# Patient Record
Sex: MALE | Race: WHITE | Employment: OTHER | URBAN - METROPOLITAN AREA
[De-identification: names, ages, dates, MRNs, and addresses within clinical notes are randomized per-mention and may not be internally consistent; named-entity substitution may affect disease eponyms.]

---

## 2022-09-19 ENCOUNTER — HOSPITAL ENCOUNTER (EMERGENCY)
Age: 62
Discharge: ANOTHER HOSPITAL | End: 2022-09-19
Attending: EMERGENCY MEDICINE
Payer: COMMERCIAL

## 2022-09-19 ENCOUNTER — APPOINTMENT (OUTPATIENT)
Dept: GENERAL RADIOLOGY | Age: 62
End: 2022-09-19
Payer: COMMERCIAL

## 2022-09-19 VITALS
HEART RATE: 133 BPM | WEIGHT: 185 LBS | RESPIRATION RATE: 22 BRPM | BODY MASS INDEX: 27.4 KG/M2 | DIASTOLIC BLOOD PRESSURE: 64 MMHG | HEIGHT: 69 IN | OXYGEN SATURATION: 99 % | TEMPERATURE: 98.2 F | SYSTOLIC BLOOD PRESSURE: 104 MMHG

## 2022-09-19 DIAGNOSIS — E13.10 SECONDARY DM WITH DKA, UNCONTROLLED (HCC): ICD-10-CM

## 2022-09-19 DIAGNOSIS — N49.3 FOURNIER'S GANGRENE OF SCROTUM: Primary | ICD-10-CM

## 2022-09-19 LAB
ALBUMIN SERPL-MCNC: 1.5 G/DL (ref 3.5–4.6)
ALP BLD-CCNC: 145 U/L (ref 35–104)
ALT SERPL-CCNC: 24 U/L (ref 0–41)
ANION GAP SERPL CALCULATED.3IONS-SCNC: 22 MEQ/L (ref 9–15)
AST SERPL-CCNC: 28 U/L (ref 0–40)
BACTERIA: NEGATIVE /HPF
BANDED NEUTROPHILS RELATIVE PERCENT: 16 %
BASE EXCESS VENOUS: -14 (ref -3–3)
BASOPHILS ABSOLUTE: 0 K/UL (ref 0–0.2)
BASOPHILS RELATIVE PERCENT: 0.2 %
BETA-HYDROXYBUTYRATE: 48.8 MG/DL (ref 0.2–2.8)
BILIRUB SERPL-MCNC: 1.1 MG/DL (ref 0.2–0.7)
BILIRUBIN URINE: NEGATIVE
BLOOD, URINE: ABNORMAL
BUN BLDV-MCNC: 48 MG/DL (ref 8–23)
CALCIUM IONIZED: 1.04 MMOL/L (ref 1.12–1.32)
CALCIUM SERPL-MCNC: 7.3 MG/DL (ref 8.5–9.9)
CHLORIDE BLD-SCNC: 99 MEQ/L (ref 95–107)
CLARITY: CLEAR
CO2: 13 MEQ/L (ref 20–31)
COLOR: ABNORMAL
CREAT SERPL-MCNC: 0.97 MG/DL (ref 0.7–1.2)
EOSINOPHILS ABSOLUTE: 0 K/UL (ref 0–0.7)
EOSINOPHILS RELATIVE PERCENT: 0 %
EPITHELIAL CELLS, UA: ABNORMAL /HPF (ref 0–5)
ETHANOL PERCENT: NORMAL G/DL
ETHANOL: <10 MG/DL (ref 0–0.08)
GFR AFRICAN AMERICAN: >60
GFR AFRICAN AMERICAN: >60
GFR NON-AFRICAN AMERICAN: 56
GFR NON-AFRICAN AMERICAN: >60
GLOBULIN: 3 G/DL (ref 2.3–3.5)
GLUCOSE BLD-MCNC: 561 MG/DL (ref 70–99)
GLUCOSE BLD-MCNC: 576 MG/DL (ref 70–99)
GLUCOSE BLD-MCNC: >600 MG/DL (ref 70–99)
GLUCOSE URINE: >=1000 MG/DL
HCO3 VENOUS: 13.3 MMOL/L (ref 23–29)
HCT VFR BLD CALC: 33 % (ref 42–52)
HEMOGLOBIN: 10.9 G/DL (ref 14–18)
HEMOGLOBIN: 12.8 GM/DL (ref 13.5–17.5)
HYALINE CASTS: ABNORMAL /HPF (ref 0–5)
KETONES, URINE: 15 MG/DL
LACTATE: 6.67 MMOL/L (ref 0.4–2)
LACTIC ACID: 6 MMOL/L (ref 0.5–2.2)
LEUKOCYTE ESTERASE, URINE: NEGATIVE
LYMPHOCYTES ABSOLUTE: 0.2 K/UL (ref 1–4.8)
LYMPHOCYTES RELATIVE PERCENT: 1 %
MCH RBC QN AUTO: 29.5 PG (ref 27–31.3)
MCHC RBC AUTO-ENTMCNC: 33.1 % (ref 33–37)
MCV RBC AUTO: 89.2 FL (ref 80–100)
MONOCYTES ABSOLUTE: 0.2 K/UL (ref 0.2–0.8)
MONOCYTES RELATIVE PERCENT: 1 %
MYELOCYTE PERCENT: 3 %
NEUTROPHILS ABSOLUTE: 18 K/UL (ref 1.4–6.5)
NEUTROPHILS RELATIVE PERCENT: 78 %
NITRITE, URINE: NEGATIVE
O2 SAT, VEN: 59 %
PCO2, VEN: 28.7 MM HG (ref 40–50)
PDW BLD-RTO: 14.7 % (ref 11.5–14.5)
PERFORMED ON: ABNORMAL
PERFORMED ON: ABNORMAL
PH UA: 5.5 (ref 5–9)
PH VENOUS: 7.27 (ref 7.32–7.42)
PLATELET # BLD: 164 K/UL (ref 130–400)
PLATELET SLIDE REVIEW: NORMAL
PO2, VEN: 34 MM HG
POC CHLORIDE: 100 MEQ/L (ref 99–110)
POC CREATININE: 1.3 MG/DL (ref 0.8–1.3)
POC HEMATOCRIT: 38 % (ref 41–53)
POC POTASSIUM: 2.8 MEQ/L (ref 3.5–5.1)
POC SAMPLE TYPE: ABNORMAL
POC SODIUM: 134 MEQ/L (ref 136–145)
POTASSIUM SERPL-SCNC: 2.8 MEQ/L (ref 3.4–4.9)
PROMYELOCYTES PERCENT: 1 %
PROTEIN UA: ABNORMAL MG/DL
RBC # BLD: 3.7 M/UL (ref 4.7–6.1)
RBC UA: ABNORMAL /HPF (ref 0–5)
SARS-COV-2, NAAT: NOT DETECTED
SODIUM BLD-SCNC: 134 MEQ/L (ref 135–144)
SPECIFIC GRAVITY UA: 1.03 (ref 1–1.03)
TCO2 CALC VENOUS: 14 MMOL/L
TOTAL CK: 296 U/L (ref 0–190)
TOTAL PROTEIN: 4.5 G/DL (ref 6.3–8)
URINE REFLEX TO CULTURE: ABNORMAL
UROBILINOGEN, URINE: 1 E.U./DL
WBC # BLD: 18.4 K/UL (ref 4.8–10.8)
WBC UA: ABNORMAL /HPF (ref 0–5)

## 2022-09-19 PROCEDURE — 2580000003 HC RX 258: Performed by: EMERGENCY MEDICINE

## 2022-09-19 PROCEDURE — 82803 BLOOD GASES ANY COMBINATION: CPT

## 2022-09-19 PROCEDURE — 71045 X-RAY EXAM CHEST 1 VIEW: CPT

## 2022-09-19 PROCEDURE — 85025 COMPLETE CBC W/AUTO DIFF WBC: CPT

## 2022-09-19 PROCEDURE — 80053 COMPREHEN METABOLIC PANEL: CPT

## 2022-09-19 PROCEDURE — 96374 THER/PROPH/DIAG INJ IV PUSH: CPT

## 2022-09-19 PROCEDURE — 85014 HEMATOCRIT: CPT

## 2022-09-19 PROCEDURE — 6370000000 HC RX 637 (ALT 250 FOR IP): Performed by: EMERGENCY MEDICINE

## 2022-09-19 PROCEDURE — 6360000002 HC RX W HCPCS: Performed by: EMERGENCY MEDICINE

## 2022-09-19 PROCEDURE — 84295 ASSAY OF SERUM SODIUM: CPT

## 2022-09-19 PROCEDURE — 83605 ASSAY OF LACTIC ACID: CPT

## 2022-09-19 PROCEDURE — 99285 EMERGENCY DEPT VISIT HI MDM: CPT

## 2022-09-19 PROCEDURE — 96365 THER/PROPH/DIAG IV INF INIT: CPT

## 2022-09-19 PROCEDURE — 36600 WITHDRAWAL OF ARTERIAL BLOOD: CPT

## 2022-09-19 PROCEDURE — 81001 URINALYSIS AUTO W/SCOPE: CPT

## 2022-09-19 PROCEDURE — 84132 ASSAY OF SERUM POTASSIUM: CPT

## 2022-09-19 PROCEDURE — 87635 SARS-COV-2 COVID-19 AMP PRB: CPT

## 2022-09-19 PROCEDURE — 82010 KETONE BODYS QUAN: CPT

## 2022-09-19 PROCEDURE — 82077 ASSAY SPEC XCP UR&BREATH IA: CPT

## 2022-09-19 PROCEDURE — 82550 ASSAY OF CK (CPK): CPT

## 2022-09-19 PROCEDURE — 82435 ASSAY OF BLOOD CHLORIDE: CPT

## 2022-09-19 PROCEDURE — 87040 BLOOD CULTURE FOR BACTERIA: CPT

## 2022-09-19 PROCEDURE — 82565 ASSAY OF CREATININE: CPT

## 2022-09-19 PROCEDURE — 86403 PARTICLE AGGLUT ANTBDY SCRN: CPT

## 2022-09-19 PROCEDURE — 82948 REAGENT STRIP/BLOOD GLUCOSE: CPT

## 2022-09-19 PROCEDURE — 51702 INSERT TEMP BLADDER CATH: CPT

## 2022-09-19 PROCEDURE — 87150 DNA/RNA AMPLIFIED PROBE: CPT

## 2022-09-19 PROCEDURE — 96375 TX/PRO/DX INJ NEW DRUG ADDON: CPT

## 2022-09-19 PROCEDURE — 82330 ASSAY OF CALCIUM: CPT

## 2022-09-19 RX ORDER — SODIUM CHLORIDE 9 MG/ML
INJECTION, SOLUTION INTRAVENOUS
Status: DISCONTINUED
Start: 2022-09-19 | End: 2022-09-19 | Stop reason: HOSPADM

## 2022-09-19 RX ORDER — 0.9 % SODIUM CHLORIDE 0.9 %
30 INTRAVENOUS SOLUTION INTRAVENOUS ONCE
Status: COMPLETED | OUTPATIENT
Start: 2022-09-19 | End: 2022-09-19

## 2022-09-19 RX ORDER — LIDOCAINE HYDROCHLORIDE 20 MG/ML
JELLY TOPICAL ONCE
Status: DISCONTINUED | OUTPATIENT
Start: 2022-09-19 | End: 2022-09-19 | Stop reason: HOSPADM

## 2022-09-19 RX ADMIN — PIPERACILLIN AND TAZOBACTAM 3375 MG: 3; .375 INJECTION, POWDER, FOR SOLUTION INTRAVENOUS at 13:49

## 2022-09-19 RX ADMIN — SODIUM CHLORIDE 2517 ML: 9 INJECTION, SOLUTION INTRAVENOUS at 13:35

## 2022-09-19 RX ADMIN — VANCOMYCIN HYDROCHLORIDE 1250 MG: 1.25 INJECTION, POWDER, LYOPHILIZED, FOR SOLUTION INTRAVENOUS at 13:52

## 2022-09-19 RX ADMIN — INSULIN HUMAN 15 UNITS: 100 INJECTION, SOLUTION PARENTERAL at 14:28

## 2022-09-19 ASSESSMENT — PAIN - FUNCTIONAL ASSESSMENT: PAIN_FUNCTIONAL_ASSESSMENT: NONE - DENIES PAIN

## 2022-09-19 NOTE — ED TRIAGE NOTES
Pt reports to the Ed via ems with co being found on the floor for over 12 hours. Pt has black testicle, red swollen groin, abrasion to left knee, outside of feet have pressure areas,  left and right elbow have abrasions, foul smell coming from groin area grainage noted. Pt states the testicles have been like that for a couple days. Pt states she has been feeling weak over the last few weeks due to have a hemorrhoid and bleeding so he was unable stand so he lowered himself to the floor. Pt laid there and scooted  himself to his phone to call for help.

## 2022-09-19 NOTE — ED PROVIDER NOTES
[address] [facility] ED  EMERGENCY DEPARTMENT ENCOUNTER      Pt Name: Josie Perez  MRN: [id number]  [place] 1960  Date of evaluation: 9/19/2022  Provider: Henry Arambula DO    CHIEF COMPLAINT       Chief Complaint   Patient presents with    Blood Infection     Found on floor for over 12 hours black testicles     Groin Pain     Jaclyn gangrene         HISTORY OF PRESENT ILLNESS   (Location/Symptom, Timing/Onset, Context/Setting, Quality, Duration, Modifying Factors, Severity)  Note limiting factors. Josie Perez is a 57 y.o. male who presents to the emergency department . Patient found on the floor after 12 hours. Is denying falling on the floor. Has groin pain. Has pressure lower abdomen feels like he needs to urinate. Has been having some difficulty urinating because his penis will come out. Patient was diagnosed with diabetes 15 years ago but lost weight and thought he was cured. Patient does not take any medications. Does not see a doctor. Does not drink alcohol. States that in July he thought he had a swollen hemorrhoid. He kept putting steroid cream on it. Now his whole groin is swollen and painful. HPI    Nursing Notes were reviewed. REVIEW OF SYSTEMS    (2-9 systems for level 4, 10 or more for level 5)     Review of Systems   Unable to perform ROS: Mental status change     Except as noted above the remainder of the review of systems was reviewed and negative. PAST MEDICAL HISTORY   No past medical history on file. SURGICAL HISTORY     No past surgical history on file. CURRENT MEDICATIONS       Previous Medications    No medications on file       ALLERGIES     Patient has no known allergies. FAMILY HISTORY     No family history on file.        SOCIAL HISTORY       Social History     Socioeconomic History    Marital status: Unknown       SCREENINGS        Monroe Coma Scale  Eye Opening: Spontaneous  Best Verbal Response: Oriented  Best Motor Response: Obeys commands  Jones Coma Scale Score: 15               PHYSICAL EXAM    (up to 7 for level 4, 8 or more for level 5)     ED Triage Vitals [09/19/22 1323]   BP Temp Temp Source Heart Rate Resp SpO2 Height Weight   (!) 89/54 98.2 °F (36.8 °C) Oral (!) 146 23 92 % 5' 9\" (1.753 m) 185 lb (83.9 kg)       Physical Exam  Constitutional:       General: He is not in acute distress. Appearance: He is well-developed. He is ill-appearing and toxic-appearing. He is not diaphoretic. HENT:      Head: Normocephalic and atraumatic. Right Ear: External ear normal.      Left Ear: External ear normal.      Nose: Nose normal.      Mouth/Throat:      Mouth: Mucous membranes are dry. Pharynx: No oropharyngeal exudate. Eyes:      Extraocular Movements: Extraocular movements intact. Conjunctiva/sclera: Conjunctivae normal.      Pupils: Pupils are equal, round, and reactive to light. Neck:      Thyroid: No thyromegaly. Vascular: No JVD. Trachea: No tracheal deviation. Cardiovascular:      Rate and Rhythm: Regular rhythm. Tachycardia present. Heart sounds: Normal heart sounds. No murmur heard. Pulmonary:      Effort: Pulmonary effort is normal. No respiratory distress. Breath sounds: Normal breath sounds. No wheezing. Abdominal:      General: Bowel sounds are normal.      Palpations: Abdomen is soft. Tenderness: There is no abdominal tenderness. There is no guarding. Genitourinary:     Comments: Entire scrotum is black and necrotic. Malodorous. Penis stuck in due to surrounding swollen tissue. Musculoskeletal:         General: Normal range of motion. Cervical back: Normal range of motion and neck supple. Right lower leg: No edema. Left lower leg: No edema. Comments: Left upper thigh swollen hot red indurated and continues into the perineum and scrotum   Skin:     General: Skin is warm and dry. Findings: No rash.    Neurological:      General: No focal deficit present. Mental Status: He is alert. Cranial Nerves: No cranial nerve deficit. Psychiatric:         Behavior: Behavior normal.       DIAGNOSTIC RESULTS     EKG: All EKG's are interpreted by the Emergency Department Physician who either signs or Co-signs this chart in the absence of a cardiologist.        RADIOLOGY:   Non-plain film images such as CT, Ultrasound and MRI are read by the radiologist. Plain radiographic images are visualized and preliminarily interpreted by the emergency physician with the below findings:        Interpretation per the Radiologist below, if available at the time of this note:    XR CHEST PORTABLE    (Results Pending)         ED BEDSIDE ULTRASOUND:   Performed by ED Physician - none    LABS:  Labs Reviewed   CBC WITH AUTO DIFFERENTIAL - Abnormal; Notable for the following components:       Result Value    WBC 18.4 (*)     RBC 3.70 (*)     Hemoglobin 10.9 (*)     Hematocrit 33.0 (*)     RDW 14.7 (*)     All other components within normal limits   CULTURE, BLOOD 1   CULTURE, BLOOD 2   COVID-19, RAPID   COMPREHENSIVE METABOLIC PANEL   ETHANOL   URINALYSIS WITH REFLEX TO CULTURE   BETA-HYDROXYBUTYRATE   LACTIC ACID   CK   POCT GLUCOSE   POCT EPOC BLOOD GAS, LACTIC ACID, ICA   POCT GLUCOSE       All other labs were within normal range or not returned as of this dictation. 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 Patient likely in DKA. Sugar reading high labs not back at this time. Initial blood pressure low blood pressure better and responding to IV fluids. Patient given vancomycin and Zosyn.   Spoke to Dr De Souza [facility] at [facility]. Accepting to ER. Venous blood gas obtained and shows patient is in DKA. Patient continues to get IV fluids and will be given 1 dose of insulin. Cannot transport patient on insulin drip. MDM        REASSESSMENT          CRITICAL CARE TIME   Total Critical Care time was 60 minutes, excluding separately reportable procedures. There was a high probability of clinically significant/life threatening deterioration in the patient's condition which required my urgent intervention. CONSULTS:  None    PROCEDURES:  Unless otherwise noted below, none     Procedures      FINAL IMPRESSION      1. Jaclyn's gangrene of scrotum    2. Secondary DM with DKA, uncontrolled ([facility] [address]          DISPOSITION/PLAN   DISPOSITION Decision To Transfer 09/19/2022 01:30:20 PM      PATIENT REFERRED TO:  No follow-up provider specified. DISCHARGE MEDICATIONS:  New Prescriptions    No medications on file     Controlled Substances Monitoring:     No flowsheet data found.     (Please note that portions of this note were completed with a voice recognition program.  Efforts were made to edit the dictations but occasionally words are mis-transcribed.)    Gabe Pagan DO (electronically signed)  Attending Emergency Physician            Gabe Pagan DO  09/19/22 4568

## 2022-09-19 NOTE — ED NOTES
Call to [facility] Transfer center to speak with Urology. Per direction of Dr. Octavio Hale called placed directly to [facility] [place] and MAC was bi-passed.      Kenya Warren  09/19/22 1400

## 2022-09-23 LAB
ACINETOBACTER CALCOAC BAUMANNII COMPLEX BY PCR: NOT DETECTED
BACTEROIDES FRAGILIS BY PCR: NOT DETECTED
BLOOD CULTURE, ROUTINE: ABNORMAL
CANDIDA ALBICANS BY PCR: NOT DETECTED
CANDIDA AURIS BY PCR: NOT DETECTED
CANDIDA GLABRATA BY PCR: NOT DETECTED
CANDIDA KRUSEI BY PCR: NOT DETECTED
CANDIDA PARAPSILOSIS BY PCR: NOT DETECTED
CANDIDA TROPICALIS BY PCR: NOT DETECTED
CRYPTOCOCCUS NEOFORMANS/GATTII BY PCR: NOT DETECTED
ENTEROBACTER CLOACAE COMPLEX BY PCR: NOT DETECTED
ENTEROBACTERALES BY PCR: NOT DETECTED
ENTEROCOCCUS FAECALIS BY PCR: NOT DETECTED
ENTEROCOCCUS FAECIUM BY PCR: NOT DETECTED
ESCHERICHIA COLI BY PCR: NOT DETECTED
HAEMOPHILUS INFLUENZAE BY PCR: NOT DETECTED
KLEBSIELLA AEROGENES BY PCR: NOT DETECTED
KLEBSIELLA OXYTOCA BY PCR: NOT DETECTED
KLEBSIELLA PNEUMONIAE GROUP BY PCR: NOT DETECTED
LISTERIA MONOCYTOGENES BY PCR: NOT DETECTED
NEISSERIA MENINGITIDIS BY PCR: NOT DETECTED
PROTEUS SPECIES BY PCR: NOT DETECTED
PSEUDOMONAS AERUGINOSA BY PCR: NOT DETECTED
SALMONELLA SPECIES BY PCR: NOT DETECTED
SERRATIA MARCESCENS BY PCR: NOT DETECTED
STAPHYLOCOCCUS AUREUS BY PCR: NOT DETECTED
STAPHYLOCOCCUS EPIDERMIDIS BY PCR: NOT DETECTED
STAPHYLOCOCCUS LUGDUNENSIS BY PCR: NOT DETECTED
STAPHYLOCOCCUS SPECIES BY PCR: NOT DETECTED
STENOTROPHOMONAS MALTOPHILIA BY PCR: NOT DETECTED
STREPTOCOCCUS AGALACTIAE BY PCR: NOT DETECTED
STREPTOCOCCUS PNEUMONIAE BY PCR: NOT DETECTED
STREPTOCOCCUS PYOGENES  BY PCR: NOT DETECTED
STREPTOCOCCUS SPECIES BY PCR: DETECTED

## 2022-09-24 LAB
CULTURE, BLOOD 2: NORMAL
CULTURE, BLOOD ID SENSITIVITY: ABNORMAL
ORGANISM: ABNORMAL

## 2022-10-06 ENCOUNTER — TELEPHONE (OUTPATIENT)
Dept: FAMILY MEDICINE CLINIC | Age: 62
End: 2022-10-06

## 2022-10-06 NOTE — TELEPHONE ENCOUNTER
----- Message from Andreina Denzel sent at 10/6/2022  8:16 AM EDT -----  Subject: Message to Provider    QUESTIONS  Information for Provider? Pt missed last appt 9/21 due to being   hospitalized and having surgery several times and skin graphs for flesh   eating bacterial infection. Pt will contact when he is discharged for   follow up after skilled nursing facilty lets him out.   ---------------------------------------------------------------------------  --------------  [id number] [employer]  [phone]; OK to leave message on voicemail  ---------------------------------------------------------------------------  --------------  SCRIPT ANSWERS  Relationship to Patient?  Self

## 2022-12-06 ENCOUNTER — TELEPHONE (OUTPATIENT)
Dept: FAMILY MEDICINE CLINIC | Age: 62
End: 2022-12-06

## 2022-12-06 DIAGNOSIS — R73.9 HYPERGLYCEMIA: Primary | ICD-10-CM

## 2022-12-06 RX ORDER — FLASH GLUCOSE SENSOR
KIT MISCELLANEOUS
Qty: 6 EACH | Refills: 3 | Status: SHIPPED | OUTPATIENT
Start: 2022-12-06

## 2022-12-06 RX ORDER — FLASH GLUCOSE SCANNING READER
EACH MISCELLANEOUS
Qty: 1 EACH | Refills: 0 | Status: SHIPPED | OUTPATIENT
Start: 2022-12-06

## 2022-12-06 RX ORDER — BLOOD-GLUCOSE SENSOR
EACH MISCELLANEOUS
Status: CANCELLED | OUTPATIENT
Start: 2022-12-06

## 2022-12-06 NOTE — TELEPHONE ENCOUNTER
Orders Placed This Encounter   Medications    Continuous Blood Gluc  (FREESTYLE SONY 14 DAY READER) HELENE     Sig: Test daily and prn. Dx: DM2     Dispense:  1 each     Refill:  0    Continuous Blood Gluc Sensor (FREESTYLE SONY 14 DAY SENSOR) MISC     Sig: Test daily and prn. Change every 2 weeks. Dx: DM2     Dispense:  6 each     Refill:  3       The above med(s) were e-scripted to the patient's pharmacy.    Please advise patient  Pau Garcia MD

## 2022-12-06 NOTE — TELEPHONE ENCOUNTER
Pt is calling stating that he has enrolled in the free style adrienne III free trial, needs a script to be sent into [facility]. Pt is in a NH at this time, will be discharged in maybe 10 days. Pt phone number is [phone].

## 2022-12-09 RX ORDER — FLASH GLUCOSE SENSOR
KIT MISCELLANEOUS
Qty: 6 EACH | Refills: 3 | Status: CANCELLED | OUTPATIENT
Start: 2022-12-09

## 2022-12-09 RX ORDER — BLOOD-GLUCOSE SENSOR
EACH MISCELLANEOUS
Qty: 1 EACH | Refills: 0 | Status: SHIPPED | OUTPATIENT
Start: 2022-12-09

## 2022-12-09 NOTE — TELEPHONE ENCOUNTER
Pt calling for the freestyle adrienne 3 does not need the device because pt has rod, just actually need the continuous monitor/sensor for the Dauphin 3 please send to [employer] in [place] please and thank you.  Brother went to  but it was for the 14 day not for the Dauphin 3 wants to know if that can be sent in please and thank you

## 2022-12-09 NOTE — TELEPHONE ENCOUNTER
Orders Placed This Encounter   Medications    Continuous Blood Gluc  (FREESTYLE SONY 14 DAY READER) HELENE     Sig: Test daily and prn. Dx: DM2     Dispense:  1 each     Refill:  0    Continuous Blood Gluc Sensor (FREESTYLE SONY 14 DAY SENSOR) MISC     Sig: Test daily and prn. Change every 2 weeks. Dx: DM2     Dispense:  6 each     Refill:  3    Continuous Blood Gluc Sensor (FREESTYLE SONY 3 SENSOR) MISC     Sig: Use as directed     Dispense:  1 each     Refill:  0       The above med(s) were e-scripted to the patient's pharmacy.    Please advise patient  Saran Hamilton MD

## 2023-01-04 LAB
AVERAGE GLUCOSE: NORMAL
HBA1C MFR BLD: 5.9 %

## 2023-02-09 ENCOUNTER — OFFICE VISIT (OUTPATIENT)
Dept: FAMILY MEDICINE CLINIC | Age: 63
End: 2023-02-09
Payer: COMMERCIAL

## 2023-02-09 VITALS
BODY MASS INDEX: 23.52 KG/M2 | OXYGEN SATURATION: 98 % | HEART RATE: 92 BPM | SYSTOLIC BLOOD PRESSURE: 106 MMHG | WEIGHT: 158.8 LBS | DIASTOLIC BLOOD PRESSURE: 68 MMHG | HEIGHT: 69 IN | TEMPERATURE: 47.5 F

## 2023-02-09 DIAGNOSIS — M72.6 NECROTIZING FASCIITIS OF LOWER LEG (HCC): ICD-10-CM

## 2023-02-09 DIAGNOSIS — E11.9 TYPE 2 DIABETES MELLITUS WITHOUT COMPLICATION, WITHOUT LONG-TERM CURRENT USE OF INSULIN (HCC): Primary | ICD-10-CM

## 2023-02-09 DIAGNOSIS — Z11.59 ENCOUNTER FOR HEPATITIS C SCREENING TEST FOR LOW RISK PATIENT: ICD-10-CM

## 2023-02-09 DIAGNOSIS — D64.9 ANEMIA, UNSPECIFIED TYPE: ICD-10-CM

## 2023-02-09 DIAGNOSIS — Z11.4 SCREENING FOR HIV (HUMAN IMMUNODEFICIENCY VIRUS): ICD-10-CM

## 2023-02-09 PROCEDURE — G8420 CALC BMI NORM PARAMETERS: HCPCS | Performed by: FAMILY MEDICINE

## 2023-02-09 PROCEDURE — G8427 DOCREV CUR MEDS BY ELIG CLIN: HCPCS | Performed by: FAMILY MEDICINE

## 2023-02-09 PROCEDURE — 1036F TOBACCO NON-USER: CPT | Performed by: FAMILY MEDICINE

## 2023-02-09 PROCEDURE — 99203 OFFICE O/P NEW LOW 30 MIN: CPT | Performed by: FAMILY MEDICINE

## 2023-02-09 PROCEDURE — 3017F COLORECTAL CA SCREEN DOC REV: CPT | Performed by: FAMILY MEDICINE

## 2023-02-09 PROCEDURE — 2022F DILAT RTA XM EVC RTNOPTHY: CPT | Performed by: FAMILY MEDICINE

## 2023-02-09 PROCEDURE — 3046F HEMOGLOBIN A1C LEVEL >9.0%: CPT | Performed by: FAMILY MEDICINE

## 2023-02-09 PROCEDURE — G8482 FLU IMMUNIZE ORDER/ADMIN: HCPCS | Performed by: FAMILY MEDICINE

## 2023-02-09 RX ORDER — METFORMIN HYDROCHLORIDE 500 MG/1
1000 TABLET, EXTENDED RELEASE ORAL
Qty: 180 TABLET | Refills: 1 | Status: SHIPPED | OUTPATIENT
Start: 2023-02-09

## 2023-02-09 RX ORDER — DOCUSATE SODIUM 100 MG/1
100 CAPSULE, LIQUID FILLED ORAL 2 TIMES DAILY
COMMUNITY

## 2023-02-09 SDOH — ECONOMIC STABILITY: HOUSING INSECURITY
IN THE LAST 12 MONTHS, WAS THERE A TIME WHEN YOU DID NOT HAVE A STEADY PLACE TO SLEEP OR SLEPT IN A SHELTER (INCLUDING NOW)?: NO

## 2023-02-09 SDOH — ECONOMIC STABILITY: FOOD INSECURITY: WITHIN THE PAST 12 MONTHS, YOU WORRIED THAT YOUR FOOD WOULD RUN OUT BEFORE YOU GOT MONEY TO BUY MORE.: NEVER TRUE

## 2023-02-09 SDOH — ECONOMIC STABILITY: INCOME INSECURITY: HOW HARD IS IT FOR YOU TO PAY FOR THE VERY BASICS LIKE FOOD, HOUSING, MEDICAL CARE, AND HEATING?: NOT HARD AT ALL

## 2023-02-09 SDOH — ECONOMIC STABILITY: FOOD INSECURITY: WITHIN THE PAST 12 MONTHS, THE FOOD YOU BOUGHT JUST DIDN'T LAST AND YOU DIDN'T HAVE MONEY TO GET MORE.: NEVER TRUE

## 2023-02-09 ASSESSMENT — PATIENT HEALTH QUESTIONNAIRE - PHQ9
SUM OF ALL RESPONSES TO PHQ QUESTIONS 1-9: 0
1. LITTLE INTEREST OR PLEASURE IN DOING THINGS: 0
SUM OF ALL RESPONSES TO PHQ QUESTIONS 1-9: 0
SUM OF ALL RESPONSES TO PHQ9 QUESTIONS 1 & 2: 0
SUM OF ALL RESPONSES TO PHQ QUESTIONS 1-9: 0
2. FEELING DOWN, DEPRESSED OR HOPELESS: 0
SUM OF ALL RESPONSES TO PHQ QUESTIONS 1-9: 0

## 2023-02-09 NOTE — PROGRESS NOTES
Chief Complaint   Patient presents with    Annual Exam     Check up, was in nursing home , was in [facility] for necrotizing faucitis    Discuss Medications     Would like something cheaper than [employer]     Health Maintenance     Would like to wait to do labs        HPI:  Charmel Barthel is a 60 y.o. male     Establish care  Was in [facility] and nursing home  Necrotizing fasciitis     Moved here from [place] 3 years ago     Had appt scheduled here and went to ER    Has had f/u with plastic surgery/urology/endocrinology     Has been back home since 12/26    He is     Patient Active Problem List   Diagnosis    Type 2 diabetes mellitus without complication (HCC)    Anemia    Necrotizing fasciitis of lower leg (HCC)       Current Outpatient Medications   Medication Sig Dispense Refill    docusate sodium (COLACE) 100 MG capsule Take 100 mg by mouth 2 times daily      metFORMIN (GLUCOPHAGE-XR) 500 MG extended release tablet Take 2 tablets by mouth daily (with breakfast) 180 tablet 1    Continuous Blood Gluc Sensor (FREESTYLE SONY 3 SENSOR) MISC Use as directed 1 each 0    Continuous Blood Gluc  (FREESTYLE SONY 14 DAY READER) HELENE Test daily and prn. Dx: DM2 1 each 0    Continuous Blood Gluc Sensor (FREESTYLE SONY 14 DAY SENSOR) MISC Test daily and prn. Change every 2 weeks. Dx: DM2 6 each 3     No current facility-administered medications for this visit. Past Medical History:   Diagnosis Date    Anemia     Difficulty walking     Jaclyn disease     Insulin long-term use ([address]     Necrotizing fasciitis of lower leg (HCC)     Type 2 diabetes mellitus without complication (HCC)     Urinary obstruction      Past Surgical History:   Procedure Laterality Date    COSMETIC SURGERY      TONSILLECTOMY       History reviewed. No pertinent family history.   Social History     Socioeconomic History    Marital status: Unknown     Spouse name: None    Number of children: None    Years of education: None    Highest education level: None   Tobacco Use    Smoking status: Never    Smokeless tobacco: Never   Substance and Sexual Activity    Alcohol use: Not Currently    Drug use: Never     Social Determinants of Health     Financial Resource Strain: Low Risk     Difficulty of Paying Living Expenses: Not hard at all   Food Insecurity: No Food Insecurity    Worried About Running Out of Food in the Last Year: Never true    Ran Out of Food in the Last Year: Never true   Transportation Needs: Unknown    Lack of Transportation (Non-Medical): No   Housing Stability: Unknown    Unstable Housing in the Last Year: No     No Known Allergies    Review of Systems:   General ROS: negative for - chills, fatigue, fever, malaise, weight gain or weight loss  Respiratory ROS: no cough, shortness of breath, or wheezing  Cardiovascular ROS: no chest pain or dyspnea on exertion  Gastrointestinal ROS: no abdominal pain, change in bowel habits, or black or bloody stools  Genito-Urinary ROS: no dysuria, trouble voiding  Musculoskeletal ROS: negative for - gait disturbance, joint pain or joint stiffness  Neurological ROS: negative for - behavioral changes, memory loss, numbness/tingling, tremors or weakness    In general patient otherwise reports feeling well. Physical Exam:  /68 (Site: Right Upper Arm)   Pulse 92   Temp (!) 47.5 °F (8.6 °C)   Ht 5' 9\" (1.753 m)   Wt 158 lb 12.8 oz (72 kg)   SpO2 98%   BMI 23.45 kg/m²     Gen: Well, NAD, Alert, Oriented x 3   HEENT: EOMI, eyes clear, MMM  Skin: without rash or jaundice  Neck: no significant lymphadenopathy or thyromegaly  Lungs: CTA B w/out Rales/Wheezes/Rhonchi, Good respiratory effort   Heart: RRR, S1S2, w/out M/R/G, non-displaced PMI   Ext: No C/C/E Bilaterally. Neuro: Neurovascularly intact w/ Sensory/Motor intact UE/LE Bilaterally.     Lab Results   Component Value Date    WBC 18.4 (H) 09/19/2022    HGB 12.8 (L) 09/19/2022    HCT 33.0 (L) 09/19/2022     09/19/2022    ALT 24 09/19/2022 AST 28 09/19/2022     (L) 09/19/2022    K 2.8 (LL) 09/19/2022    CL 99 09/19/2022    CREATININE 1.3 09/19/2022    BUN 48 (H) 09/19/2022    CO2 13 (L) 09/19/2022         A&P   Diagnosis Orders   1. Type 2 diabetes mellitus without complication, without long-term current use of insulin (HCC)  metFORMIN (GLUCOPHAGE-XR) 500 MG extended release tablet    Lipid Panel      2. Screening for HIV (human immunodeficiency virus)  HIV Screen      3. Encounter for hepatitis C screening test for low risk patient  Hepatitis C Antibody      4. Anemia, unspecified type        5.  Necrotizing fasciitis of lower leg (HCC)          Will change to metformin     Labs as ordered    F/u 6 mos       Tianna Hirsch MD

## 2023-04-05 ENCOUNTER — TELEPHONE (OUTPATIENT)
Dept: GASTROENTEROLOGY | Age: 63
End: 2023-04-05

## 2023-07-26 DIAGNOSIS — E11.9 TYPE 2 DIABETES MELLITUS WITHOUT COMPLICATION, WITHOUT LONG-TERM CURRENT USE OF INSULIN (HCC): ICD-10-CM

## 2023-07-26 DIAGNOSIS — Z11.59 ENCOUNTER FOR HEPATITIS C SCREENING TEST FOR LOW RISK PATIENT: ICD-10-CM

## 2023-07-26 DIAGNOSIS — E11.9 TYPE 2 DIABETES MELLITUS WITHOUT COMPLICATION, WITHOUT LONG-TERM CURRENT USE OF INSULIN (HCC): Primary | ICD-10-CM

## 2023-07-26 DIAGNOSIS — Z11.4 SCREENING FOR HIV (HUMAN IMMUNODEFICIENCY VIRUS): ICD-10-CM

## 2023-07-26 LAB
CHOLEST SERPL-MCNC: 223 MG/DL (ref 0–199)
HBA1C MFR BLD: 6.1 % (ref 4.8–5.9)
HDLC SERPL-MCNC: 60 MG/DL (ref 40–59)
LDLC SERPL CALC-MCNC: 148 MG/DL (ref 0–129)
TRIGL SERPL-MCNC: 76 MG/DL (ref 0–150)

## 2023-07-27 LAB
HEPATITIS C ANTIBODY: NONREACTIVE
HIV AG/AB: NONREACTIVE

## 2023-08-08 ENCOUNTER — APPOINTMENT (OUTPATIENT)
Dept: URBAN - METROPOLITAN AREA CLINIC 204 | Age: 63
Setting detail: DERMATOLOGY
End: 2023-08-09

## 2023-08-08 PROCEDURE — 99213 OFFICE O/P EST LOW 20 MIN: CPT | Mod: 25

## 2023-08-08 PROCEDURE — 17003 DESTRUCT PREMALG LES 2-14: CPT

## 2023-08-08 PROCEDURE — 17000 DESTRUCT PREMALG LESION: CPT

## 2023-08-08 PROCEDURE — OTHER MIPS QUALITY: OTHER

## 2023-08-10 ENCOUNTER — OFFICE VISIT (OUTPATIENT)
Dept: FAMILY MEDICINE CLINIC | Age: 63
End: 2023-08-10
Payer: COMMERCIAL

## 2023-08-10 VITALS
BODY MASS INDEX: 25.03 KG/M2 | DIASTOLIC BLOOD PRESSURE: 70 MMHG | TEMPERATURE: 99 F | HEART RATE: 77 BPM | OXYGEN SATURATION: 98 % | HEIGHT: 69 IN | WEIGHT: 169 LBS | SYSTOLIC BLOOD PRESSURE: 120 MMHG

## 2023-08-10 DIAGNOSIS — E78.5 DYSLIPIDEMIA: ICD-10-CM

## 2023-08-10 DIAGNOSIS — Z12.5 SCREENING PSA (PROSTATE SPECIFIC ANTIGEN): ICD-10-CM

## 2023-08-10 DIAGNOSIS — Z12.11 SCREEN FOR COLON CANCER: ICD-10-CM

## 2023-08-10 DIAGNOSIS — E11.9 TYPE 2 DIABETES MELLITUS WITHOUT COMPLICATION, WITHOUT LONG-TERM CURRENT USE OF INSULIN (HCC): Primary | ICD-10-CM

## 2023-08-10 DIAGNOSIS — E11.9 TYPE 2 DIABETES MELLITUS WITHOUT COMPLICATION, WITHOUT LONG-TERM CURRENT USE OF INSULIN (HCC): ICD-10-CM

## 2023-08-10 DIAGNOSIS — Z86.2 HISTORY OF ANEMIA: ICD-10-CM

## 2023-08-10 LAB
ALBUMIN SERPL-MCNC: 4.4 G/DL (ref 3.5–4.6)
ALP SERPL-CCNC: 91 U/L (ref 35–104)
ALT SERPL-CCNC: 35 U/L (ref 0–41)
ANION GAP SERPL CALCULATED.3IONS-SCNC: 10 MEQ/L (ref 9–15)
AST SERPL-CCNC: 27 U/L (ref 0–40)
BILIRUB SERPL-MCNC: 1.5 MG/DL (ref 0.2–0.7)
BUN SERPL-MCNC: 19 MG/DL (ref 8–23)
CALCIUM SERPL-MCNC: 9.6 MG/DL (ref 8.5–9.9)
CHLORIDE SERPL-SCNC: 104 MEQ/L (ref 95–107)
CO2 SERPL-SCNC: 26 MEQ/L (ref 20–31)
CREAT SERPL-MCNC: 0.54 MG/DL (ref 0.7–1.2)
CREAT UR-MCNC: 114.3 MG/DL
ERYTHROCYTE [DISTWIDTH] IN BLOOD BY AUTOMATED COUNT: 13.7 % (ref 11.5–14.5)
GLOBULIN SER CALC-MCNC: 2.5 G/DL (ref 2.3–3.5)
GLUCOSE SERPL-MCNC: 174 MG/DL (ref 70–99)
HCT VFR BLD AUTO: 39.9 % (ref 42–52)
HGB BLD-MCNC: 13.9 G/DL (ref 14–18)
MCH RBC QN AUTO: 30.9 PG (ref 27–31.3)
MCHC RBC AUTO-ENTMCNC: 34.7 % (ref 33–37)
MCV RBC AUTO: 89.1 FL (ref 79–92.2)
MICROALBUMIN UR-MCNC: <1.2 MG/DL
MICROALBUMIN/CREAT UR-RTO: NORMAL MG/G (ref 0–30)
PLATELET # BLD AUTO: 147 K/UL (ref 130–400)
POTASSIUM SERPL-SCNC: 4.4 MEQ/L (ref 3.4–4.9)
PROT SERPL-MCNC: 6.9 G/DL (ref 6.3–8)
PSA SERPL-MCNC: 0.83 NG/ML (ref 0–4)
RBC # BLD AUTO: 4.48 M/UL (ref 4.7–6.1)
SODIUM SERPL-SCNC: 140 MEQ/L (ref 135–144)
WBC # BLD AUTO: 6.4 K/UL (ref 4.8–10.8)

## 2023-08-10 PROCEDURE — G8420 CALC BMI NORM PARAMETERS: HCPCS | Performed by: FAMILY MEDICINE

## 2023-08-10 PROCEDURE — G8427 DOCREV CUR MEDS BY ELIG CLIN: HCPCS | Performed by: FAMILY MEDICINE

## 2023-08-10 PROCEDURE — 99214 OFFICE O/P EST MOD 30 MIN: CPT | Performed by: FAMILY MEDICINE

## 2023-08-10 PROCEDURE — 1036F TOBACCO NON-USER: CPT | Performed by: FAMILY MEDICINE

## 2023-08-10 PROCEDURE — 3017F COLORECTAL CA SCREEN DOC REV: CPT | Performed by: FAMILY MEDICINE

## 2023-08-10 PROCEDURE — 3044F HG A1C LEVEL LT 7.0%: CPT | Performed by: FAMILY MEDICINE

## 2023-08-10 PROCEDURE — 2022F DILAT RTA XM EVC RTNOPTHY: CPT | Performed by: FAMILY MEDICINE

## 2023-08-10 RX ORDER — METFORMIN HYDROCHLORIDE 500 MG/1
1000 TABLET, EXTENDED RELEASE ORAL
Qty: 180 TABLET | Refills: 1 | Status: SHIPPED | OUTPATIENT
Start: 2023-08-10

## 2023-08-10 NOTE — PROGRESS NOTES
Chief Complaint   Patient presents with    Results     Go over recent blood work     Diabetes     6 month        HPI:  Lennox Minks is a 60 y.o. male     Check up  Review recent labs    A1c good at 6.1    Lipids high  Declining statin right now       Patient Active Problem List   Diagnosis    Type 2 diabetes mellitus without complication (HCC)    Anemia    Necrotizing fasciitis of lower leg (HCC)       Current Outpatient Medications   Medication Sig Dispense Refill    metFORMIN (GLUCOPHAGE-XR) 500 MG extended release tablet Take 2 tablets by mouth daily (with breakfast) 180 tablet 1    Continuous Blood Gluc Sensor (FREESTYLE SONY 3 SENSOR) MISC Use as directed 1 each 0    Continuous Blood Gluc  (FREESTYLE SONY 14 DAY READER) HELENE Test daily and prn. Dx: DM2 1 each 0    docusate sodium (COLACE) 100 MG capsule Take 100 mg by mouth 2 times daily      Continuous Blood Gluc Sensor (FREESTYLE SONY 14 DAY SENSOR) MISC Test daily and prn. Change every 2 weeks. Dx: DM2 6 each 3     No current facility-administered medications for this visit. Past Medical History:   Diagnosis Date    Anemia     Difficulty walking     Jaclyn disease     Insulin long-term use ([address] )     Necrotizing fasciitis of lower leg (HCC)     Type 2 diabetes mellitus without complication (HCC)     Urinary obstruction      Past Surgical History:   Procedure Laterality Date    COSMETIC SURGERY      TONSILLECTOMY       History reviewed. No pertinent family history.   Social History     Socioeconomic History    Marital status: Unknown     Spouse name: None    Number of children: None    Years of education: None    Highest education level: None   Tobacco Use    Smoking status: Never    Smokeless tobacco: Never   Substance and Sexual Activity    Alcohol use: Not Currently    Drug use: Never     Social Determinants of Health     Financial Resource Strain: Low Risk     Difficulty of Paying Living Expenses: Not hard at all   Food Insecurity: No

## 2023-08-11 LAB
FOLATE: 7.2 NG/ML
IRON SATURATION: 23 % (ref 20–55)
IRON: 55 UG/DL (ref 59–158)
TOTAL IRON BINDING CAPACITY: 240 UG/DL (ref 250–450)
UNSATURATED IRON BINDING CAPACITY: 185 UG/DL (ref 112–347)
VITAMIN B-12: 335 PG/ML (ref 232–1245)

## 2023-09-05 DIAGNOSIS — E11.9 TYPE 2 DIABETES MELLITUS WITHOUT COMPLICATION, WITHOUT LONG-TERM CURRENT USE OF INSULIN (HCC): ICD-10-CM

## 2023-09-05 NOTE — TELEPHONE ENCOUNTER
Comments: pt is asking for a 90 day supply    Last Office Visit (last PCP visit):   8/10/2023    Next Visit Date:  Future Appointments   Date Time Provider [address]   2/12/2024  2:00 PM Halima España MD [facility]       **If hasn't been seen in over a year OR hasn't followed up according to last diabetes/ADHD visit, make appointment for patient before sending refill to provider.     Rx requested:  Requested Prescriptions     Pending Prescriptions Disp Refills    metFORMIN (GLUCOPHAGE-XR) 500 MG extended release tablet 180 tablet 1     Sig: Take 2 tablets by mouth daily (with breakfast)

## 2023-09-08 RX ORDER — METFORMIN HYDROCHLORIDE 500 MG/1
1000 TABLET, EXTENDED RELEASE ORAL
Qty: 180 TABLET | Refills: 1 | Status: SHIPPED | OUTPATIENT
Start: 2023-09-08

## 2023-12-05 DIAGNOSIS — R73.9 HYPERGLYCEMIA: ICD-10-CM

## 2023-12-05 RX ORDER — BLOOD-GLUCOSE SENSOR
EACH MISCELLANEOUS
Qty: 6 EACH | Refills: 5 | Status: SHIPPED | OUTPATIENT
Start: 2023-12-05

## 2023-12-05 NOTE — TELEPHONE ENCOUNTER
Comments: Please review    Last Office Visit (last PCP visit):   8/10/2023    Next Visit Date:  Future Appointments   Date Time Provider [address]   2/12/2024  2:00 PM Bhaskar Harden MD [facility] [place]       **If hasn't been seen in over a year OR hasn't followed up according to last diabetes/ADHD visit, make appointment for patient before sending refill to provider.     Rx requested:  Requested Prescriptions     Pending Prescriptions Disp Refills    Continuous Blood Gluc Sensor (FREESTYLE SONY 3 SENSOR) MISC [Pharmacy Med Name: Rosa Peaks 3 SENSOR] 6 each      Sig: use every 14 days as directed by prescriber

## 2024-01-17 DIAGNOSIS — E11.9 TYPE 2 DIABETES MELLITUS WITHOUT COMPLICATION, WITHOUT LONG-TERM CURRENT USE OF INSULIN (HCC): ICD-10-CM

## 2024-01-18 RX ORDER — METFORMIN HYDROCHLORIDE 500 MG/1
1000 TABLET, EXTENDED RELEASE ORAL
Qty: 180 TABLET | Refills: 3 | Status: SHIPPED | OUTPATIENT
Start: 2024-01-18

## 2024-01-18 NOTE — TELEPHONE ENCOUNTER
Comments:     Last Office Visit (last PCP visit):   08/30/2023    Next Visit Date:  Future Appointments   Date Time Provider Department Center   2/12/2024  2:00 PM Tobias Lewis MD [facility]       **If hasn't been seen in over a year OR hasn't followed up according to last diabetes/ADHD visit, make appointment for patient before sending refill to provider.    Rx requested:  Requested Prescriptions     Pending Prescriptions Disp Refills    metFORMIN (GLUCOPHAGE-XR) 500 MG extended release tablet [Pharmacy Med Name: metFORMIN HCl  MG Oral Tablet Extended Release 24 Hour] 180 tablet 3     Sig: TAKE 2 TABLETS BY MOUTH DAILY  WITH BREAKFAST

## 2024-01-22 ENCOUNTER — OFFICE VISIT (OUTPATIENT)
Dept: FAMILY MEDICINE CLINIC | Age: 64
End: 2024-01-22
Payer: COMMERCIAL

## 2024-01-22 VITALS
DIASTOLIC BLOOD PRESSURE: 60 MMHG | HEIGHT: 69 IN | WEIGHT: 190.2 LBS | BODY MASS INDEX: 28.17 KG/M2 | OXYGEN SATURATION: 98 % | SYSTOLIC BLOOD PRESSURE: 112 MMHG | HEART RATE: 97 BPM

## 2024-01-22 DIAGNOSIS — L08.9 SOFT TISSUE INFECTION: Primary | ICD-10-CM

## 2024-01-22 PROCEDURE — G8484 FLU IMMUNIZE NO ADMIN: HCPCS | Performed by: FAMILY MEDICINE

## 2024-01-22 PROCEDURE — 1036F TOBACCO NON-USER: CPT | Performed by: FAMILY MEDICINE

## 2024-01-22 PROCEDURE — 99213 OFFICE O/P EST LOW 20 MIN: CPT | Performed by: FAMILY MEDICINE

## 2024-01-22 PROCEDURE — G8427 DOCREV CUR MEDS BY ELIG CLIN: HCPCS | Performed by: FAMILY MEDICINE

## 2024-01-22 PROCEDURE — 3017F COLORECTAL CA SCREEN DOC REV: CPT | Performed by: FAMILY MEDICINE

## 2024-01-22 PROCEDURE — G8419 CALC BMI OUT NRM PARAM NOF/U: HCPCS | Performed by: FAMILY MEDICINE

## 2024-01-22 RX ORDER — SULFAMETHOXAZOLE AND TRIMETHOPRIM 800; 160 MG/1; MG/1
1 TABLET ORAL 2 TIMES DAILY
Qty: 14 TABLET | Refills: 0 | Status: SHIPPED | OUTPATIENT
Start: 2024-01-22 | End: 2024-01-29

## 2024-01-22 ASSESSMENT — PATIENT HEALTH QUESTIONNAIRE - PHQ9
SUM OF ALL RESPONSES TO PHQ9 QUESTIONS 1 & 2: 0
SUM OF ALL RESPONSES TO PHQ QUESTIONS 1-9: 0
2. FEELING DOWN, DEPRESSED OR HOPELESS: 0
SUM OF ALL RESPONSES TO PHQ QUESTIONS 1-9: 0
1. LITTLE INTEREST OR PLEASURE IN DOING THINGS: 0

## 2024-01-22 ASSESSMENT — ENCOUNTER SYMPTOMS: BACK PAIN: 0

## 2024-01-22 NOTE — PROGRESS NOTES
[facility]  [address], [address]  [facility] [address]  Dept: [phone]  Dept Fax: [phone]  Loc: [phone]     1/22/2024    Visit type: Follow up    Reason for Visit: Abscess (Pt states abscess around 1 1/2 inches on his Lt buttock, groin area. States there's some tenderness and sore, symptoms started 2 days ago.  Denies pain, itchy, discharge. )         Patient: Bruce Almendarez is a 63 y.o. male   HPI: 63-year-old male presents with possible abscess of his left lower medial gluteal region.  Patient states he has had some tenderness and has been ongoing for about 2 days.  Patient does have a history of severe necrotizing fasciitis.    ASSESSMENT/PLAN   1. Soft tissue infection  -     sulfamethoxazole-trimethoprim (BACTRIM DS;SEPTRA DS) 800-160 MG per tablet; Take 1 tablet by mouth 2 times daily for 7 days, Disp-14 tablet, R-0Normal    -Patient will monitor for fevers or worsening  -Adverse effects of medications prescribed were discussed, patient acknowledged understanding.  -Patient was advised to let me know if there is no improvement in the next 7 to 10 days. Discussed red flags warranting decision to go to the emergency department and patient understood.     Orders Placed This Encounter   Medications    sulfamethoxazole-trimethoprim (BACTRIM DS;SEPTRA DS) 800-160 MG per tablet     Sig: Take 1 tablet by mouth 2 times daily for 7 days     Dispense:  14 tablet     Refill:  0        Treatment plan/ rationale and result expectations have been discussed with the patient who expresses understanding and desires to proceed.  Medication adverse effects, labs, images have been reviewed with the patient.    Subjective      Review of Systems   Musculoskeletal:  Negative for arthralgias, back pain, gait problem, joint swelling, myalgias and neck pain.        Left gluteal pain and swelling   Skin:  Negative for wound.        Possible abscess of the

## 2024-02-01 ENCOUNTER — OFFICE VISIT (OUTPATIENT)
Dept: FAMILY MEDICINE CLINIC | Age: 64
End: 2024-02-01
Payer: COMMERCIAL

## 2024-02-01 VITALS
OXYGEN SATURATION: 98 % | HEART RATE: 72 BPM | DIASTOLIC BLOOD PRESSURE: 62 MMHG | BODY MASS INDEX: 27.85 KG/M2 | TEMPERATURE: 97.4 F | SYSTOLIC BLOOD PRESSURE: 126 MMHG | WEIGHT: 188.6 LBS

## 2024-02-01 DIAGNOSIS — H92.02 EAR PAIN, LEFT: ICD-10-CM

## 2024-02-01 DIAGNOSIS — H66.002 NON-RECURRENT ACUTE SUPPURATIVE OTITIS MEDIA OF LEFT EAR WITHOUT SPONTANEOUS RUPTURE OF TYMPANIC MEMBRANE: Primary | ICD-10-CM

## 2024-02-01 DIAGNOSIS — L08.9 SOFT TISSUE INFECTION: ICD-10-CM

## 2024-02-01 DIAGNOSIS — H61.23 BILATERAL IMPACTED CERUMEN: ICD-10-CM

## 2024-02-01 PROCEDURE — 1036F TOBACCO NON-USER: CPT | Performed by: FAMILY MEDICINE

## 2024-02-01 PROCEDURE — 3017F COLORECTAL CA SCREEN DOC REV: CPT | Performed by: FAMILY MEDICINE

## 2024-02-01 PROCEDURE — G8427 DOCREV CUR MEDS BY ELIG CLIN: HCPCS | Performed by: FAMILY MEDICINE

## 2024-02-01 PROCEDURE — 69209 REMOVE IMPACTED EAR WAX UNI: CPT | Performed by: FAMILY MEDICINE

## 2024-02-01 PROCEDURE — 99213 OFFICE O/P EST LOW 20 MIN: CPT | Performed by: FAMILY MEDICINE

## 2024-02-01 PROCEDURE — G8484 FLU IMMUNIZE NO ADMIN: HCPCS | Performed by: FAMILY MEDICINE

## 2024-02-01 PROCEDURE — G8419 CALC BMI OUT NRM PARAM NOF/U: HCPCS | Performed by: FAMILY MEDICINE

## 2024-02-01 RX ORDER — AMOXICILLIN AND CLAVULANATE POTASSIUM 875; 125 MG/1; MG/1
1 TABLET, FILM COATED ORAL 2 TIMES DAILY
Qty: 14 TABLET | Refills: 0 | Status: SHIPPED | OUTPATIENT
Start: 2024-02-01 | End: 2024-02-08

## 2024-02-01 ASSESSMENT — ENCOUNTER SYMPTOMS
SHORTNESS OF BREATH: 0
DIARRHEA: 0
ROS SKIN COMMENTS: GLUTEAL ABSCESS
ABDOMINAL PAIN: 0
EYE DISCHARGE: 0
RHINORRHEA: 0
SORE THROAT: 0
COUGH: 0
NAUSEA: 0
VOMITING: 0
EYE REDNESS: 0

## 2024-02-01 NOTE — PROGRESS NOTES
[facility]  [address], [address]  [facility] [address]  Dept: [phone]  Dept Fax: [phone]  Loc: [phone]     2/1/2024    Visit type: Follow up    Reason for Visit: Otalgia (Pt states  that LT ear is bothering him and he believes to be throwing off balance. X 2 days), Wound Infection (Pt states that he was in on 1/22/24 for wound on buttocks. Notes that it is doing much better but asking that you take a look at it. ), and Health Maintenance (Patricia with [facility] on the 12th)         Patient: Bruce Almendarez is a 63 y.o. male   HPI: 63-year-old male presents with left ear pain and pressure causing him to have balance issues especially when he first attempts to stand from laying or seated position.  Patient states this been ongoing for the last few days.  In addition patient would like me to inspect previous wound, he had an abscess and was prescribed antibiotic on previous encounter, states that has improved.    ASSESSMENT/PLAN   1. Non-recurrent acute suppurative otitis media of left ear without spontaneous rupture of tympanic membrane  -     amoxicillin-clavulanate (AUGMENTIN) 875-125 MG per tablet; Take 1 tablet by mouth 2 times daily for 7 days, Disp-14 tablet, R-0Normal  2. Ear pain, left  3. Bilateral impacted cerumen  -     OR REMOVAL IMPACTED CERUMEN IRRIGATION/LVG UNILAT  4. Soft tissue infection  -Gluteal abscess inspected, no sign of infection at this time.  No pus is noted, drying out and in the process of healing completely    -Erythematous and bulging left tympanic membrane once cerumen was irrigated, medication prescribed  -Adverse effects of medications prescribed were discussed, patient acknowledged understanding.  -Patient was advised to let me know if there is no improvement in the next 7 to 10 days. Discussed red flags warranting decision to go to the emergency department and patient understood.   -Patient had significant

## 2024-02-12 ENCOUNTER — OFFICE VISIT (OUTPATIENT)
Dept: FAMILY MEDICINE CLINIC | Age: 64
End: 2024-02-12
Payer: COMMERCIAL

## 2024-02-12 VITALS
DIASTOLIC BLOOD PRESSURE: 62 MMHG | HEIGHT: 69 IN | BODY MASS INDEX: 28.26 KG/M2 | HEART RATE: 75 BPM | SYSTOLIC BLOOD PRESSURE: 124 MMHG | TEMPERATURE: 97.2 F | WEIGHT: 190.8 LBS | OXYGEN SATURATION: 99 %

## 2024-02-12 DIAGNOSIS — E11.9 TYPE 2 DIABETES MELLITUS WITHOUT COMPLICATION, WITHOUT LONG-TERM CURRENT USE OF INSULIN (HCC): ICD-10-CM

## 2024-02-12 DIAGNOSIS — E11.9 TYPE 2 DIABETES MELLITUS WITHOUT COMPLICATION, WITHOUT LONG-TERM CURRENT USE OF INSULIN (HCC): Primary | ICD-10-CM

## 2024-02-12 DIAGNOSIS — L08.9 SOFT TISSUE INFECTION: ICD-10-CM

## 2024-02-12 DIAGNOSIS — H61.22 EXCESSIVE CERUMEN IN LEFT EAR CANAL: ICD-10-CM

## 2024-02-12 LAB — HBA1C MFR BLD: 6.5 % (ref 4.8–5.9)

## 2024-02-12 PROCEDURE — 99214 OFFICE O/P EST MOD 30 MIN: CPT | Performed by: FAMILY MEDICINE

## 2024-02-12 PROCEDURE — 2022F DILAT RTA XM EVC RTNOPTHY: CPT | Performed by: FAMILY MEDICINE

## 2024-02-12 PROCEDURE — G8484 FLU IMMUNIZE NO ADMIN: HCPCS | Performed by: FAMILY MEDICINE

## 2024-02-12 PROCEDURE — G8427 DOCREV CUR MEDS BY ELIG CLIN: HCPCS | Performed by: FAMILY MEDICINE

## 2024-02-12 PROCEDURE — 3017F COLORECTAL CA SCREEN DOC REV: CPT | Performed by: FAMILY MEDICINE

## 2024-02-12 PROCEDURE — 3046F HEMOGLOBIN A1C LEVEL >9.0%: CPT | Performed by: FAMILY MEDICINE

## 2024-02-12 PROCEDURE — G8419 CALC BMI OUT NRM PARAM NOF/U: HCPCS | Performed by: FAMILY MEDICINE

## 2024-02-12 PROCEDURE — 1036F TOBACCO NON-USER: CPT | Performed by: FAMILY MEDICINE

## 2024-02-12 NOTE — PROGRESS NOTES
Chief Complaint   Patient presents with    Diabetes     6 month     Other     Saw Dr. Segura for abscess, lower buttocks and for ear infection, hearing hasn't come back yet        HPI:  Bruce Almendarez is a 63 y.o. male     Check up  Review recent labs    Was in to see Dr. Segura twice in past few weeks  Two courses of abx  Abscess opened spontaneously   Bactrim   Then augmentin for OM    A1c good at 6.1    Lipids high  Declining statin right now     Due for a1c      Patient Active Problem List   Diagnosis    Type 2 diabetes mellitus without complication (HCC)    Anemia    Necrotizing fasciitis of lower leg (HCC)       Current Outpatient Medications   Medication Sig Dispense Refill    carbamide peroxide (DEBROX) 6.5 % otic solution Place 5 drops in ear(s) 2 times daily 15 mL 0    metFORMIN (GLUCOPHAGE-XR) 500 MG extended release tablet TAKE 2 TABLETS BY MOUTH DAILY  WITH BREAKFAST 180 tablet 3    Continuous Blood Gluc Sensor (FREESTYLE SONY 3 SENSOR) MISC use every 14 days as directed by prescriber 6 each 5    Continuous Blood Gluc  (FREESTYLE SONY 14 DAY READER) HELENE Test daily and prn. Dx: DM2 1 each 0     No current facility-administered medications for this visit.         Past Medical History:   Diagnosis Date    Anemia     Difficulty walking     Jaclyn disease     Insulin long-term use (HCC)     Necrotizing fasciitis of lower leg (HCC)     Type 2 diabetes mellitus without complication (HCC)     Urinary obstruction      Past Surgical History:   Procedure Laterality Date    COSMETIC SURGERY      TONSILLECTOMY       No family history on file.  Social History     Socioeconomic History    Marital status: Unknown     Spouse name: None    Number of children: None    Years of education: None    Highest education level: None   Tobacco Use    Smoking status: Never    Smokeless tobacco: Never   Substance and Sexual Activity    Alcohol use: Not Currently    Drug use: Never     Social Determinants of Health

## 2024-08-12 ENCOUNTER — OFFICE VISIT (OUTPATIENT)
Dept: FAMILY MEDICINE CLINIC | Age: 64
End: 2024-08-12
Payer: COMMERCIAL

## 2024-08-12 VITALS
HEART RATE: 71 BPM | BODY MASS INDEX: 28.35 KG/M2 | OXYGEN SATURATION: 100 % | WEIGHT: 191.4 LBS | SYSTOLIC BLOOD PRESSURE: 138 MMHG | TEMPERATURE: 98.1 F | DIASTOLIC BLOOD PRESSURE: 78 MMHG | HEIGHT: 69 IN

## 2024-08-12 DIAGNOSIS — H61.23 BILATERAL IMPACTED CERUMEN: ICD-10-CM

## 2024-08-12 DIAGNOSIS — E11.9 TYPE 2 DIABETES MELLITUS WITHOUT COMPLICATION, WITHOUT LONG-TERM CURRENT USE OF INSULIN (HCC): Primary | ICD-10-CM

## 2024-08-12 DIAGNOSIS — E78.5 DYSLIPIDEMIA: ICD-10-CM

## 2024-08-12 DIAGNOSIS — Z12.5 SCREENING PSA (PROSTATE SPECIFIC ANTIGEN): ICD-10-CM

## 2024-08-12 PROCEDURE — G8419 CALC BMI OUT NRM PARAM NOF/U: HCPCS | Performed by: FAMILY MEDICINE

## 2024-08-12 PROCEDURE — 3017F COLORECTAL CA SCREEN DOC REV: CPT | Performed by: FAMILY MEDICINE

## 2024-08-12 PROCEDURE — 99214 OFFICE O/P EST MOD 30 MIN: CPT | Performed by: FAMILY MEDICINE

## 2024-08-12 PROCEDURE — 1036F TOBACCO NON-USER: CPT | Performed by: FAMILY MEDICINE

## 2024-08-12 PROCEDURE — 3044F HG A1C LEVEL LT 7.0%: CPT | Performed by: FAMILY MEDICINE

## 2024-08-12 PROCEDURE — G8427 DOCREV CUR MEDS BY ELIG CLIN: HCPCS | Performed by: FAMILY MEDICINE

## 2024-08-12 PROCEDURE — 2022F DILAT RTA XM EVC RTNOPTHY: CPT | Performed by: FAMILY MEDICINE

## 2024-08-12 NOTE — PROGRESS NOTES
Vital Sign     Worried About Running Out of Food in the Last Year: Never true     Ran Out of Food in the Last Year: Never true   Transportation Needs: Unknown (2/12/2024)    PRAPARE - Transportation     Lack of Transportation (Non-Medical): No   Physical Activity: Insufficiently Active (1/16/2023)    Received from [facility]    Exercise Vital Sign     Days of Exercise per Week: 2 days     Minutes of Exercise per Session: 20 min   Stress: No Stress Concern Present (1/16/2023)    Received from [facility]    [employer] [place] [employer] - Occupational Stress Questionnaire     Feeling of Stress : Not at all   Social Connections: Moderately Integrated (1/16/2023)    Received from [employer]    Social Connection and Isolation Panel [NHANES]     Frequency of Communication with Friends and Family: Twice a week     Frequency of Social Gatherings with Friends and Family: Once a week     Attends Mu-ism Services: More than 4 times per year     Active Member of Clubs or Organizations: Yes     Attends Club or Organization Meetings: 1 to 4 times per year     Marital Status: Never    Intimate Partner Violence: Not At Risk (1/16/2023)    Received from [employer]    Humiliation, Afraid, Rape, and Kick questionnaire     Fear of Current or Ex-Partner: No     Emotionally Abused: No     Physically Abused: No     Sexually Abused: No   Housing Stability: Unknown (2/12/2024)    Housing Stability Vital Sign     Unstable Housing in the Last Year: No     No Known Allergies    Review of Systems:   General ROS: negative for - chills, fatigue, fever, malaise, weight gain or weight loss  Respiratory ROS: no cough, shortness of breath, or wheezing  Cardiovascular ROS: no chest pain or dyspnea on exertion  Gastrointestinal ROS: no abdominal pain, change in bowel habits, or black or bloody stools  Genito-Urinary ROS: no dysuria, trouble voiding  Musculoskeletal ROS:

## 2024-08-15 ENCOUNTER — APPOINTMENT (OUTPATIENT)
Dept: URBAN - METROPOLITAN AREA CLINIC 204 | Age: 64
Setting detail: DERMATOLOGY
End: 2024-08-16

## 2024-08-15 PROCEDURE — 99213 OFFICE O/P EST LOW 20 MIN: CPT | Mod: 25

## 2024-08-15 PROCEDURE — 17003 DESTRUCT PREMALG LES 2-14: CPT

## 2024-08-15 PROCEDURE — OTHER MIPS QUALITY: OTHER

## 2024-08-15 PROCEDURE — 17000 DESTRUCT PREMALG LESION: CPT

## 2024-08-26 DIAGNOSIS — E11.9 TYPE 2 DIABETES MELLITUS WITHOUT COMPLICATION, WITHOUT LONG-TERM CURRENT USE OF INSULIN (HCC): ICD-10-CM

## 2024-08-26 DIAGNOSIS — R73.9 HYPERGLYCEMIA: ICD-10-CM

## 2024-08-26 DIAGNOSIS — Z12.5 SCREENING PSA (PROSTATE SPECIFIC ANTIGEN): ICD-10-CM

## 2024-08-26 DIAGNOSIS — E78.5 DYSLIPIDEMIA: ICD-10-CM

## 2024-08-26 LAB
ALBUMIN SERPL-MCNC: 4.4 G/DL (ref 3.5–4.6)
ALP SERPL-CCNC: 73 U/L (ref 35–104)
ALT SERPL-CCNC: 39 U/L (ref 0–41)
ANION GAP SERPL CALCULATED.3IONS-SCNC: 10 MEQ/L (ref 9–15)
AST SERPL-CCNC: 39 U/L (ref 0–40)
BILIRUB SERPL-MCNC: 1.9 MG/DL (ref 0.2–0.7)
BUN SERPL-MCNC: 20 MG/DL (ref 8–23)
CALCIUM SERPL-MCNC: 9 MG/DL (ref 8.5–9.9)
CHLORIDE SERPL-SCNC: 105 MEQ/L (ref 95–107)
CHOLEST SERPL-MCNC: 198 MG/DL (ref 0–199)
CO2 SERPL-SCNC: 25 MEQ/L (ref 20–31)
CREAT SERPL-MCNC: 0.59 MG/DL (ref 0.7–1.2)
CREAT UR-MCNC: 189.8 MG/DL
ERYTHROCYTE [DISTWIDTH] IN BLOOD BY AUTOMATED COUNT: 13.5 % (ref 11.5–14.5)
GLOBULIN SER CALC-MCNC: 2.3 G/DL (ref 2.3–3.5)
GLUCOSE SERPL-MCNC: 177 MG/DL (ref 70–99)
HCT VFR BLD AUTO: 40.5 % (ref 42–52)
HDLC SERPL-MCNC: 54 MG/DL (ref 40–59)
HGB BLD-MCNC: 13.9 G/DL (ref 14–18)
LDLC SERPL CALC-MCNC: 130 MG/DL (ref 0–129)
MCH RBC QN AUTO: 30.7 PG (ref 27–31.3)
MCHC RBC AUTO-ENTMCNC: 34.3 % (ref 33–37)
MCV RBC AUTO: 89.4 FL (ref 79–92.2)
MICROALBUMIN UR-MCNC: 1.5 MG/DL
MICROALBUMIN/CREAT UR-RTO: 7.9 MG/G (ref 0–30)
PLATELET # BLD AUTO: 146 K/UL (ref 130–400)
POTASSIUM SERPL-SCNC: 4.4 MEQ/L (ref 3.4–4.9)
PROT SERPL-MCNC: 6.7 G/DL (ref 6.3–8)
PSA SERPL-MCNC: 0.18 NG/ML (ref 0–4)
RBC # BLD AUTO: 4.53 M/UL (ref 4.7–6.1)
SODIUM SERPL-SCNC: 140 MEQ/L (ref 135–144)
TRIGL SERPL-MCNC: 72 MG/DL (ref 0–150)
WBC # BLD AUTO: 4.4 K/UL (ref 4.8–10.8)

## 2024-08-26 RX ORDER — BLOOD-GLUCOSE SENSOR
EACH MISCELLANEOUS
Qty: 6 EACH | Refills: 5 | Status: SHIPPED | OUTPATIENT
Start: 2024-08-26

## 2024-08-26 NOTE — TELEPHONE ENCOUNTER
Comments: my chart message sent to make 6 mos follow up    Last Office Visit (last PCP visit):   8/12/2024    Next Visit Date:  No future appointments.    **If hasn't been seen in over a year OR hasn't followed up according to last diabetes/ADHD visit, make appointment for patient before sending refill to provider.    Rx requested:  Requested Prescriptions     Pending Prescriptions Disp Refills    Continuous Glucose Sensor (FREESTYLE SONY 3 SENSOR) MISC 6 each 5     Sig: use every 14 days as directed by prescriber

## 2024-08-27 LAB
ESTIMATED AVERAGE GLUCOSE: 126 MG/DL
HBA1C MFR BLD: 6 % (ref 4–6)

## 2024-09-05 ENCOUNTER — TELEPHONE (OUTPATIENT)
Dept: FAMILY MEDICINE CLINIC | Age: 64
End: 2024-09-05

## 2024-09-23 DIAGNOSIS — R73.9 HYPERGLYCEMIA: ICD-10-CM

## 2024-09-24 RX ORDER — BLOOD-GLUCOSE SENSOR
EACH MISCELLANEOUS
Qty: 6 EACH | Refills: 5 | Status: SHIPPED | OUTPATIENT
Start: 2024-09-24

## 2024-10-22 ENCOUNTER — TELEPHONE (OUTPATIENT)
Dept: FAMILY MEDICINE CLINIC | Age: 64
End: 2024-10-22

## 2024-10-22 DIAGNOSIS — E11.9 TYPE 2 DIABETES MELLITUS WITHOUT COMPLICATION, WITHOUT LONG-TERM CURRENT USE OF INSULIN (HCC): Primary | ICD-10-CM

## 2024-10-22 RX ORDER — BLOOD-GLUCOSE SENSOR
EACH MISCELLANEOUS
Qty: 9 EACH | Refills: 3 | Status: SHIPPED | OUTPATIENT
Start: 2024-10-22

## 2024-10-22 NOTE — TELEPHONE ENCOUNTER
Pt calling to request the 3+ instead of the 3, temporarily change because it's hard to get the 3.  And [facility] has the 3+ and he has a coupon from the  and it's for the 3+

## 2024-10-22 NOTE — TELEPHONE ENCOUNTER
[facility] calling needing clarification on the free style adrienne. Please call them back at [phone].

## 2024-12-13 DIAGNOSIS — E11.9 TYPE 2 DIABETES MELLITUS WITHOUT COMPLICATION, WITHOUT LONG-TERM CURRENT USE OF INSULIN (HCC): ICD-10-CM

## 2024-12-13 RX ORDER — METFORMIN HYDROCHLORIDE 500 MG/1
1000 TABLET, EXTENDED RELEASE ORAL
Qty: 180 TABLET | Refills: 3 | Status: SHIPPED | OUTPATIENT
Start: 2024-12-13

## 2024-12-13 NOTE — TELEPHONE ENCOUNTER
Comments:     Last Office Visit (last PCP visit):   8/12/2024    Next Visit Date:  No future appointments.    **If hasn't been seen in over a year OR hasn't followed up according to last diabetes/ADHD visit, make appointment for patient before sending refill to provider.    Rx requested:  Requested Prescriptions     Pending Prescriptions Disp Refills    metFORMIN (GLUCOPHAGE-XR) 500 MG extended release tablet 180 tablet 3     Sig: Take 2 tablets by mouth daily (with breakfast)

## 2025-01-07 ENCOUNTER — TELEPHONE (OUTPATIENT)
Dept: FAMILY MEDICINE CLINIC | Age: 65
End: 2025-01-07

## 2025-01-07 DIAGNOSIS — E11.9 TYPE 2 DIABETES MELLITUS WITHOUT COMPLICATION, WITHOUT LONG-TERM CURRENT USE OF INSULIN (HCC): Primary | ICD-10-CM

## 2025-01-07 RX ORDER — KETOROLAC TROMETHAMINE 30 MG/ML
1 INJECTION, SOLUTION INTRAMUSCULAR; INTRAVENOUS AS NEEDED
Qty: 1 EACH | Refills: 0 | Status: SHIPPED | OUTPATIENT
Start: 2025-01-07

## 2025-01-07 RX ORDER — ACYCLOVIR 800 MG/1
1 TABLET ORAL
Qty: 9 EACH | Refills: 5 | Status: SHIPPED | OUTPATIENT
Start: 2025-01-07

## 2025-01-07 NOTE — TELEPHONE ENCOUNTER
Orders Placed This Encounter   Medications    Continuous Glucose Sensor (FREESTYLE SONY 3 SENSOR) MISC     Si each by Does not apply route every 14 days     Dispense:  9 each     Refill:  5    Continuous Glucose  (FREESTYLE SONY 3 READER) HELENE     Si each by Does not apply route as needed (blood glucose)     Dispense:  1 each     Refill:  0       The above med(s) were e-scripted to the patient's pharmacy.   Please advise patient  Tobias Lewis MD

## 2025-01-07 NOTE — TELEPHONE ENCOUNTER
Pt calling requesting that the freestyle adrienne changed from the plus to a regular three. Please send to [facility] in [place].

## 2025-02-12 SDOH — ECONOMIC STABILITY: FOOD INSECURITY: WITHIN THE PAST 12 MONTHS, YOU WORRIED THAT YOUR FOOD WOULD RUN OUT BEFORE YOU GOT MONEY TO BUY MORE.: NEVER TRUE

## 2025-02-12 SDOH — ECONOMIC STABILITY: FOOD INSECURITY: WITHIN THE PAST 12 MONTHS, THE FOOD YOU BOUGHT JUST DIDN'T LAST AND YOU DIDN'T HAVE MONEY TO GET MORE.: NEVER TRUE

## 2025-02-12 SDOH — ECONOMIC STABILITY: TRANSPORTATION INSECURITY
IN THE PAST 12 MONTHS, HAS LACK OF TRANSPORTATION KEPT YOU FROM MEETINGS, WORK, OR FROM GETTING THINGS NEEDED FOR DAILY LIVING?: NO

## 2025-02-12 SDOH — ECONOMIC STABILITY: TRANSPORTATION INSECURITY
IN THE PAST 12 MONTHS, HAS THE LACK OF TRANSPORTATION KEPT YOU FROM MEDICAL APPOINTMENTS OR FROM GETTING MEDICATIONS?: NO

## 2025-02-12 SDOH — ECONOMIC STABILITY: INCOME INSECURITY: IN THE LAST 12 MONTHS, WAS THERE A TIME WHEN YOU WERE NOT ABLE TO PAY THE MORTGAGE OR RENT ON TIME?: NO

## 2025-02-12 ASSESSMENT — PATIENT HEALTH QUESTIONNAIRE - PHQ9
SUM OF ALL RESPONSES TO PHQ QUESTIONS 1-9: 0
SUM OF ALL RESPONSES TO PHQ QUESTIONS 1-9: 0
1. LITTLE INTEREST OR PLEASURE IN DOING THINGS: NOT AT ALL
SUM OF ALL RESPONSES TO PHQ9 QUESTIONS 1 & 2: 0
1. LITTLE INTEREST OR PLEASURE IN DOING THINGS: NOT AT ALL
SUM OF ALL RESPONSES TO PHQ QUESTIONS 1-9: 0
SUM OF ALL RESPONSES TO PHQ9 QUESTIONS 1 & 2: 0
SUM OF ALL RESPONSES TO PHQ QUESTIONS 1-9: 0
2. FEELING DOWN, DEPRESSED OR HOPELESS: NOT AT ALL
2. FEELING DOWN, DEPRESSED OR HOPELESS: NOT AT ALL

## 2025-02-13 ENCOUNTER — OFFICE VISIT (OUTPATIENT)
Dept: FAMILY MEDICINE CLINIC | Age: 65
End: 2025-02-13

## 2025-02-13 VITALS
BODY MASS INDEX: 29.86 KG/M2 | TEMPERATURE: 97.6 F | HEART RATE: 76 BPM | DIASTOLIC BLOOD PRESSURE: 80 MMHG | RESPIRATION RATE: 12 BRPM | WEIGHT: 197 LBS | SYSTOLIC BLOOD PRESSURE: 136 MMHG | HEIGHT: 68 IN

## 2025-02-13 DIAGNOSIS — H91.92 HEARING LOSS OF LEFT EAR, UNSPECIFIED HEARING LOSS TYPE: ICD-10-CM

## 2025-02-13 DIAGNOSIS — E11.9 TYPE 2 DIABETES MELLITUS WITHOUT COMPLICATION, WITHOUT LONG-TERM CURRENT USE OF INSULIN (HCC): Primary | ICD-10-CM

## 2025-02-13 DIAGNOSIS — E78.5 DYSLIPIDEMIA: ICD-10-CM

## 2025-02-13 DIAGNOSIS — R73.9 HYPERGLYCEMIA: ICD-10-CM

## 2025-02-13 DIAGNOSIS — E11.9 TYPE 2 DIABETES MELLITUS WITHOUT COMPLICATION, WITHOUT LONG-TERM CURRENT USE OF INSULIN (HCC): ICD-10-CM

## 2025-02-13 DIAGNOSIS — H61.22 EXCESSIVE CERUMEN IN LEFT EAR CANAL: ICD-10-CM

## 2025-02-13 DIAGNOSIS — H92.02 EAR PAIN, LEFT: ICD-10-CM

## 2025-02-13 NOTE — PROGRESS NOTES
Chief Complaint   Patient presents with    Diabetes     6 month     Referral - General     ENT for left ear       HPI:  Bruce Almendarez is a 64 y.o. male     Check up    Wt Readings from Last 3 Encounters:   02/13/25 89.4 kg (197 lb)   08/12/24 86.8 kg (191 lb 6.4 oz)   02/12/24 86.5 kg (190 lb 12.8 oz)         Hemoglobin A1C   Date Value Ref Range Status   08/26/2024 6.0 4.0 - 6.0 % Final         Lipids high  Declining statin right now   Decreased dietary cholesterol and is on metamucil     Colonoscopy normal in May      Patient Active Problem List   Diagnosis    Type 2 diabetes mellitus without complication (HCC)    Anemia    Necrotizing fasciitis of lower leg       Current Outpatient Medications   Medication Sig Dispense Refill    Continuous Glucose Sensor (FREESTYLE SONY 3 SENSOR) MISC 1 each by Does not apply route every 14 days 9 each 5    Continuous Glucose  (FREESTYLE SONY 3 READER) HELENE 1 each by Does not apply route as needed (blood glucose) 1 each 0    metFORMIN (GLUCOPHAGE-XR) 500 MG extended release tablet Take 2 tablets by mouth daily (with breakfast) 180 tablet 3     No current facility-administered medications for this visit.         Past Medical History:   Diagnosis Date    Anemia     Difficulty walking     Jaclyn disease     Insulin long-term use (HCC)     Necrotizing fasciitis of lower leg     Type 2 diabetes mellitus without complication (HCC)     Urinary obstruction      Past Surgical History:   Procedure Laterality Date    COSMETIC SURGERY      TONSILLECTOMY       History reviewed. No pertinent family history.  Social History     Socioeconomic History    Marital status: Unknown     Spouse name: None    Number of children: None    Years of education: None    Highest education level: None   Tobacco Use    Smoking status: Never    Smokeless tobacco: Never   Substance and Sexual Activity    Alcohol use: Not Currently    Drug use: Never     Social Determinants of Health     Financial Resource

## 2025-02-14 LAB
ESTIMATED AVERAGE GLUCOSE: 126 MG/DL
HBA1C MFR BLD: 6 % (ref 4–6)

## 2025-03-17 ENCOUNTER — OFFICE VISIT (OUTPATIENT)
Age: 65
End: 2025-03-17
Payer: MEDICARE

## 2025-03-17 VITALS
HEIGHT: 68 IN | SYSTOLIC BLOOD PRESSURE: 119 MMHG | BODY MASS INDEX: 29.86 KG/M2 | WEIGHT: 197 LBS | HEART RATE: 88 BPM | DIASTOLIC BLOOD PRESSURE: 79 MMHG | RESPIRATION RATE: 15 BRPM | TEMPERATURE: 96.4 F | OXYGEN SATURATION: 99 %

## 2025-03-17 DIAGNOSIS — H90.42 SENSORINEURAL HEARING LOSS (SNHL) OF LEFT EAR WITH UNRESTRICTED HEARING OF RIGHT EAR: Primary | ICD-10-CM

## 2025-03-17 PROCEDURE — G8427 DOCREV CUR MEDS BY ELIG CLIN: HCPCS | Performed by: STUDENT IN AN ORGANIZED HEALTH CARE EDUCATION/TRAINING PROGRAM

## 2025-03-17 PROCEDURE — 1036F TOBACCO NON-USER: CPT | Performed by: STUDENT IN AN ORGANIZED HEALTH CARE EDUCATION/TRAINING PROGRAM

## 2025-03-17 PROCEDURE — 3017F COLORECTAL CA SCREEN DOC REV: CPT | Performed by: STUDENT IN AN ORGANIZED HEALTH CARE EDUCATION/TRAINING PROGRAM

## 2025-03-17 PROCEDURE — 99203 OFFICE O/P NEW LOW 30 MIN: CPT | Performed by: STUDENT IN AN ORGANIZED HEALTH CARE EDUCATION/TRAINING PROGRAM

## 2025-03-17 PROCEDURE — 1123F ACP DISCUSS/DSCN MKR DOCD: CPT | Performed by: STUDENT IN AN ORGANIZED HEALTH CARE EDUCATION/TRAINING PROGRAM

## 2025-03-17 PROCEDURE — G8419 CALC BMI OUT NRM PARAM NOF/U: HCPCS | Performed by: STUDENT IN AN ORGANIZED HEALTH CARE EDUCATION/TRAINING PROGRAM

## 2025-03-17 NOTE — PROGRESS NOTES
Currently       PMH, Surgical Hx, Family Hx, and Social Hx reviewed and updated.    Objective     Vitals:    03/17/25 1358   BP: 119/79   Pulse: 88   Resp: 15   Temp: (!) 96.4 °F (35.8 °C)   TempSrc: Temporal   SpO2: 99%   Weight: 89.4 kg (197 lb)   Height: 1.727 m (5' 8\")        Physical Exam  Constitutional:       Appearance: Normal appearance.   HENT:      Head: Normocephalic and atraumatic.      Right Ear: Tympanic membrane, ear canal and external ear normal.      Left Ear: Tympanic membrane, ear canal and external ear normal.      Nose: Nose normal.      Mouth/Throat:      Mouth: Mucous membranes are moist.   Pulmonary:      Effort: Pulmonary effort is normal.      Breath sounds: No stridor.   Neurological:      General: No focal deficit present.      Mental Status: He is alert and oriented to person, place, and time.   Psychiatric:         Mood and Affect: Mood normal.         Behavior: Behavior normal.         Side effects, adverse effects of the medication prescribed today, as well as treatment plan/ rationale and result expectations have been discussed with the patient who expresses understanding and desires to proceed.  Close follow up to evaluate treatment results and for coordination of care.    I have reviewed the patient's medical history in detail and updated the computerized patient record.      Heather Velasco MD

## 2025-04-16 ENCOUNTER — HOSPITAL ENCOUNTER (OUTPATIENT)
Dept: MRI IMAGING | Age: 65
Discharge: HOME | End: 2025-04-18
Attending: STUDENT IN AN ORGANIZED HEALTH CARE EDUCATION/TRAINING PROGRAM
Payer: MEDICARE

## 2025-04-16 DIAGNOSIS — H90.42 SENSORINEURAL HEARING LOSS (SNHL) OF LEFT EAR WITH UNRESTRICTED HEARING OF RIGHT EAR: ICD-10-CM

## 2025-04-16 PROCEDURE — 6360000004 HC RX CONTRAST MEDICATION: Performed by: STUDENT IN AN ORGANIZED HEALTH CARE EDUCATION/TRAINING PROGRAM

## 2025-04-16 PROCEDURE — 70553 MRI BRAIN STEM W/O & W/DYE: CPT

## 2025-04-16 PROCEDURE — A9579 GAD-BASE MR CONTRAST NOS,1ML: HCPCS | Performed by: STUDENT IN AN ORGANIZED HEALTH CARE EDUCATION/TRAINING PROGRAM

## 2025-04-16 RX ADMIN — GADOTERIDOL 20 ML: 279.3 INJECTION, SOLUTION INTRAVENOUS at 09:54

## 2025-05-01 ENCOUNTER — SCHEDULED TELEPHONE ENCOUNTER (OUTPATIENT)
Age: 65
End: 2025-05-01
Payer: MEDICARE

## 2025-05-01 DIAGNOSIS — K11.8 PAROTID MASS: ICD-10-CM

## 2025-05-01 DIAGNOSIS — H90.42 SENSORINEURAL HEARING LOSS (SNHL) OF LEFT EAR WITH UNRESTRICTED HEARING OF RIGHT EAR: Primary | ICD-10-CM

## 2025-05-01 DIAGNOSIS — K11.8 PAROTID MASS: Primary | ICD-10-CM

## 2025-05-01 PROCEDURE — 99213 OFFICE O/P EST LOW 20 MIN: CPT | Performed by: STUDENT IN AN ORGANIZED HEALTH CARE EDUCATION/TRAINING PROGRAM

## 2025-05-02 NOTE — PROGRESS NOTES
ENT Phone Follow up     Recall   Last seen 3/17/25  Asymmetric left sided hearing loss (severe) with very poor WRS (10%). Symptoms for over 1 yr. 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  No mass or  abnormal enhancement within the cerebellopontine angle cisterns or internal  auditory canals.  No abnormal enhancement seen along of the facial or  vestibulocochlear nerves. The inner ear structures appear unremarkable. The  middle ear cavities are clear. The cisternal segments of the trigeminal  nerves appear unremarkable.     INTRACRANIAL STRUCTURES/VENTRICLES:  There is no acute infarct. No mass  effect or midline shift.  No evidence of an acute intracranial hemorrhage. No  abnormal extra-axial fluid collection. The ventricles and sulci are normal in  size and configuration.  The sellar/suprasellar regions appear unremarkable.  The normal signal voids within the major intracranial vessels appear  maintained.  No abnormal focus of enhancement is seen within the brain.     ORBITS: The visualized portion of the orbits demonstrate no acute abnormality.     SINUSES: The visualized paranasal sinuses and mastoid air cells are well  aerated.     BONES/SOFT TISSUES: The bone marrow signal intensity appears normal. The soft  tissues demonstrate no acute abnormality.     MISCELLANEOUS: 1.4 x 0.9 cm bilobular cystic-appearing lesion along the right  parotid tail.  It is only partially included in the field view of this study.     IMPRESSION:  1.  Unremarkable MRI of the IACs.  No evidence vestibular schwannoma.  2. Loop of the right AICA protrudes into the right IAC.  The significance of this finding is unclear.  Some authors suggest it may have an association with audio vestibular symptoms,

## 2025-05-19 NOTE — PROGRESS NOTES
VASCULAR MEDICINE AND INTERVENTIONAL RADIOLOGY DEPARTMENT:     Chief Complaint   Patient presents with    New Patient     Consult- Parotid mass- Dr Velasco     HPI: Bruce Almendarez, a male of 64 y.o. came to the office 5/20/2025, referred by Dr. Velasco, for right parotid nodule biopsy.  MRI of internal auditory canals performed on 4/16/2025 revealed: 1.4 x 0.9 cm partially-visualized, cystic-appearing lesion along the right parotid tail.   Patient was originally following with Dr. Velasco for asymmetric left-sided hearing loss. MRI was performed to rule out retrocochlear process with incidental finding of the right parotid nodule.  He denies prior biopsy in the head and neck area.     Patient denies blood or bleeding disorders. He denies cardiac disease or lung disease. Patient denies history of MI or stroke.   He takes baby aspirin 81 mg as a preventative medication. Denies other blood thinning or anticoagulation medications including OTC ibuprofen or Aleve  Patient denies smoking history. Patient occasionally drinks alcohol. Denies drug use.  Denies chest pain. Denies dyspnea.     No family history on file.    Medical history: Type 2 diabetes, anemia, dyslipidemia, history of necrotizing fasciitis of lower leg in 2022, basal cell carcinoma forehead    Past Surgical History:   Procedure Laterality Date    COSMETIC SURGERY      TONSILLECTOMY          Past Medical History:   Diagnosis Date    Anemia     Difficulty walking     Jaclyn disease (HCC)     Insulin long-term use (HCC)     Necrotizing fasciitis of lower leg (HCC)     Type 2 diabetes mellitus without complication (HCC)     Urinary obstruction        Social History     Socioeconomic History    Marital status: Single   Tobacco Use    Smoking status: Never    Smokeless tobacco: Never   Substance and Sexual Activity    Alcohol use: Not Currently    Drug use: Never     Social Drivers of Health     Financial Resource Strain: Low Risk  (2/12/2024)    Overall Financial

## 2025-05-20 ENCOUNTER — OFFICE VISIT (OUTPATIENT)
Age: 65
End: 2025-05-20
Payer: MEDICARE

## 2025-05-20 VITALS
DIASTOLIC BLOOD PRESSURE: 66 MMHG | SYSTOLIC BLOOD PRESSURE: 116 MMHG | OXYGEN SATURATION: 98 % | HEIGHT: 68 IN | BODY MASS INDEX: 29.86 KG/M2 | RESPIRATION RATE: 16 BRPM | HEART RATE: 80 BPM | WEIGHT: 197 LBS

## 2025-05-20 DIAGNOSIS — K11.8 PAROTID MASS: Primary | ICD-10-CM

## 2025-05-20 DIAGNOSIS — Z86.39 HX OF HYPERLIPIDEMIA: ICD-10-CM

## 2025-05-20 DIAGNOSIS — Z01.812 PRE-OPERATIVE LABORATORY EXAMINATION: ICD-10-CM

## 2025-05-20 PROCEDURE — 99203 OFFICE O/P NEW LOW 30 MIN: CPT

## 2025-05-20 PROCEDURE — 3017F COLORECTAL CA SCREEN DOC REV: CPT

## 2025-05-20 PROCEDURE — G8419 CALC BMI OUT NRM PARAM NOF/U: HCPCS

## 2025-05-20 PROCEDURE — 1123F ACP DISCUSS/DSCN MKR DOCD: CPT

## 2025-05-20 PROCEDURE — 1036F TOBACCO NON-USER: CPT

## 2025-05-20 PROCEDURE — G8427 DOCREV CUR MEDS BY ELIG CLIN: HCPCS

## 2025-05-20 PROCEDURE — 99204 OFFICE O/P NEW MOD 45 MIN: CPT

## 2025-05-28 DIAGNOSIS — K11.8 PAROTID MASS: ICD-10-CM

## 2025-05-28 DIAGNOSIS — Z01.812 PRE-OPERATIVE LABORATORY EXAMINATION: ICD-10-CM

## 2025-05-28 DIAGNOSIS — Z86.39 HX OF HYPERLIPIDEMIA: ICD-10-CM

## 2025-05-28 LAB
ERYTHROCYTE [DISTWIDTH] IN BLOOD BY AUTOMATED COUNT: 12.9 % (ref 11.5–14.5)
HCT VFR BLD AUTO: 42.7 % (ref 42–52)
HGB BLD-MCNC: 15.1 G/DL (ref 14–18)
INR PPP: 1.1
MCH RBC QN AUTO: 32.1 PG (ref 27–31.3)
MCHC RBC AUTO-ENTMCNC: 35.4 % (ref 33–37)
MCV RBC AUTO: 90.9 FL (ref 79–92.2)
PLATELET # BLD AUTO: 165 K/UL (ref 130–400)
PROTHROMBIN TIME: 14.7 SEC (ref 12.3–14.9)
RBC # BLD AUTO: 4.7 M/UL (ref 4.7–6.1)
WBC # BLD AUTO: 4.8 K/UL (ref 4.8–10.8)

## 2025-06-12 ENCOUNTER — PROCEDURE VISIT (OUTPATIENT)
Age: 65
End: 2025-06-12

## 2025-06-12 ENCOUNTER — TELEPHONE (OUTPATIENT)
Dept: FAMILY MEDICINE CLINIC | Age: 65
End: 2025-06-12

## 2025-06-12 ENCOUNTER — ANCILLARY PROCEDURE (OUTPATIENT)
Age: 65
End: 2025-06-12
Payer: MEDICARE

## 2025-06-12 DIAGNOSIS — K11.8 PAROTID MASS: ICD-10-CM

## 2025-06-12 DIAGNOSIS — K11.8 PAROTID MASS: Primary | ICD-10-CM

## 2025-06-12 PROCEDURE — 10005 FNA BX W/US GDN 1ST LES: CPT | Performed by: RADIOLOGY

## 2025-06-12 NOTE — TELEPHONE ENCOUNTER
----- Message from Sona IQBAL sent at 6/12/2025 11:22 AM EDT -----  Regarding: [facility] Appointment Request  [facility] Appointment Request    Patient needs appointment for [facility] Appointment Type: Hospital Follow Up.    Patient Requested Dates(s):within 10 days  Patient Requested Time: afternoon  Provider Name:   Tobias Lewis MD        Reason for Appointment Request: Other unable to reach the practice -the patient wanted to have a follow up with his pcp after his procedure today  6/12/25 and wanted to discuss his lab result. Hospital follow up. Please call back the patient for further assistance.Thank you  --------------------------------------------------------------------------------------------------------------------------    Relationship to Patient: Self     Call Back Information: OK to leave message on voicemail  Preferred Call Back Number: Phone [phone]

## 2025-06-13 NOTE — PROGRESS NOTES
IMPRESSION:  Successful ultrasound-guided fine-needle aspiration of right parotid cystic lesion. Fluid was sent for microbiology, cytology, and cytometry.        CLINICAL HISTORY: MALATHI NEELY is a Male of 64 years age, referred for ultrasound-guided core biopsy of a cystic nodule in the right parotid gland noted on recent sonography.  PROCEDURE: Survey of the neck showed an entirely fluid-filled cystic lesion in the right parotid gland.    After obtaining informed consent, the patient was positioned supine on the sonography table. A transverse approach was used. The skin over the right parotid gland was cleansed with ChloraPrep. Cutaneous and deeper subcutaneous anesthesia was achieved using 1% Lidocaine. An aspiration needle was inserted into the cyst under ultrasound and fine-needle aspiration was performed under direct ultrasound guidance. Specimens were sent in a separate container for genetic testing, microbiology, and cytometry. The lesion was entirely evacuated with no further fluid or remnant seen on ultrasound.  Hemostasis was achieved by direct digital compression.  Post procedure images did not demonstrate excessive hemorrhage at the target site. The patient tolerated the procedure well.  Following the procedure, the wound was cleansed and compressed.  Band-aid was applied and the patient was given post biopsy instructions. The patient left the department in good condition. A radiology medical assistant and ultrasound technologist were in presence assisting throughout the procedure.

## 2025-06-14 LAB
ACUTE LEUKEMIA MARKERS SPEC-IMP: NORMAL
EVENTS COUNTED SPEC: 22 MARKERS
PROF LEUK/LYMPH PHENO 16 OR MORE MARKERS: NORMAL
SOURCE: NORMAL
TECHNICAL LEUK/LYMPH PHENO, 22 MARKERS: NORMAL

## 2025-06-16 ENCOUNTER — TELEPHONE (OUTPATIENT)
Age: 65
End: 2025-06-16

## 2025-06-16 NOTE — TELEPHONE ENCOUNTER
Called patient to discuss results.  Parotid biopsy Negative for malignancy, adequate for analysis  We discussed it was an incidental finding on MRI. Pt does not wish to have surgery. We discussed ongoing observation and he will contact my office if he has any questions or concerns.

## 2025-06-18 LAB — CULTURE WOUND: NORMAL

## 2025-08-11 SDOH — HEALTH STABILITY: PHYSICAL HEALTH: ON AVERAGE, HOW MANY MINUTES DO YOU ENGAGE IN EXERCISE AT THIS LEVEL?: 10 MIN

## 2025-08-11 SDOH — HEALTH STABILITY: PHYSICAL HEALTH: ON AVERAGE, HOW MANY DAYS PER WEEK DO YOU ENGAGE IN MODERATE TO STRENUOUS EXERCISE (LIKE A BRISK WALK)?: 2 DAYS

## 2025-08-11 ASSESSMENT — LIFESTYLE VARIABLES
HOW OFTEN DO YOU HAVE A DRINK CONTAINING ALCOHOL: MONTHLY OR LESS
HOW OFTEN DO YOU HAVE A DRINK CONTAINING ALCOHOL: 2
HOW MANY STANDARD DRINKS CONTAINING ALCOHOL DO YOU HAVE ON A TYPICAL DAY: 1
HOW MANY STANDARD DRINKS CONTAINING ALCOHOL DO YOU HAVE ON A TYPICAL DAY: 1 OR 2
HOW OFTEN DO YOU HAVE SIX OR MORE DRINKS ON ONE OCCASION: 1

## 2025-08-11 ASSESSMENT — PATIENT HEALTH QUESTIONNAIRE - PHQ9
SUM OF ALL RESPONSES TO PHQ QUESTIONS 1-9: 0
1. LITTLE INTEREST OR PLEASURE IN DOING THINGS: NOT AT ALL
2. FEELING DOWN, DEPRESSED OR HOPELESS: NOT AT ALL
SUM OF ALL RESPONSES TO PHQ QUESTIONS 1-9: 0

## 2025-08-13 ENCOUNTER — OFFICE VISIT (OUTPATIENT)
Dept: FAMILY MEDICINE CLINIC | Age: 65
End: 2025-08-13
Payer: MEDICARE

## 2025-08-13 VITALS
HEART RATE: 87 BPM | DIASTOLIC BLOOD PRESSURE: 60 MMHG | TEMPERATURE: 97.8 F | HEIGHT: 68 IN | BODY MASS INDEX: 29.46 KG/M2 | OXYGEN SATURATION: 98 % | WEIGHT: 194.4 LBS | SYSTOLIC BLOOD PRESSURE: 128 MMHG

## 2025-08-13 DIAGNOSIS — E11.9 TYPE 2 DIABETES MELLITUS WITHOUT COMPLICATION, WITHOUT LONG-TERM CURRENT USE OF INSULIN (HCC): ICD-10-CM

## 2025-08-13 DIAGNOSIS — Z12.5 SCREENING PSA (PROSTATE SPECIFIC ANTIGEN): ICD-10-CM

## 2025-08-13 DIAGNOSIS — Z00.00 WELCOME TO MEDICARE PREVENTIVE VISIT: Primary | ICD-10-CM

## 2025-08-13 LAB
ALBUMIN SERPL-MCNC: 4.7 G/DL (ref 3.5–4.6)
ALP SERPL-CCNC: 74 U/L (ref 35–104)
ALT SERPL-CCNC: 38 U/L (ref 0–41)
ANION GAP SERPL CALCULATED.3IONS-SCNC: 10 MEQ/L (ref 9–15)
AST SERPL-CCNC: 31 U/L (ref 0–40)
BILIRUB SERPL-MCNC: 2.2 MG/DL (ref 0.2–0.7)
BUN SERPL-MCNC: 21 MG/DL (ref 8–23)
CALCIUM SERPL-MCNC: 9.7 MG/DL (ref 8.5–9.9)
CHLORIDE SERPL-SCNC: 104 MEQ/L (ref 95–107)
CHOLEST SERPL-MCNC: 212 MG/DL (ref 0–199)
CO2 SERPL-SCNC: 26 MEQ/L (ref 20–31)
CREAT SERPL-MCNC: 0.61 MG/DL (ref 0.7–1.2)
CREAT UR-MCNC: 188.2 MG/DL
ERYTHROCYTE [DISTWIDTH] IN BLOOD BY AUTOMATED COUNT: 12.7 % (ref 11.5–14.5)
GLOBULIN SER CALC-MCNC: 2.7 G/DL (ref 2.3–3.5)
GLUCOSE SERPL-MCNC: 168 MG/DL (ref 70–99)
HCT VFR BLD AUTO: 42.3 % (ref 42–52)
HDLC SERPL-MCNC: 58 MG/DL (ref 40–59)
HGB BLD-MCNC: 15 G/DL (ref 14–18)
LDLC SERPL CALC-MCNC: 139 MG/DL (ref 0–129)
MCH RBC QN AUTO: 31.3 PG (ref 27–31.3)
MCHC RBC AUTO-ENTMCNC: 35.5 % (ref 33–37)
MCV RBC AUTO: 88.1 FL (ref 79–92.2)
MICROALBUMIN UR-MCNC: 1.6 MG/DL
MICROALBUMIN/CREAT UR-RTO: 8.5 MG/G (ref 0–30)
PLATELET # BLD AUTO: 150 K/UL (ref 130–400)
POTASSIUM SERPL-SCNC: 5 MEQ/L (ref 3.4–4.9)
PROT SERPL-MCNC: 7.4 G/DL (ref 6.3–8)
PSA SERPL-MCNC: 0.32 NG/ML (ref 0–4)
RBC # BLD AUTO: 4.8 M/UL (ref 4.7–6.1)
SODIUM SERPL-SCNC: 140 MEQ/L (ref 135–144)
TRIGL SERPL-MCNC: 75 MG/DL (ref 0–150)
WBC # BLD AUTO: 5 K/UL (ref 4.8–10.8)

## 2025-08-13 PROCEDURE — 1123F ACP DISCUSS/DSCN MKR DOCD: CPT | Performed by: FAMILY MEDICINE

## 2025-08-13 PROCEDURE — G0402 INITIAL PREVENTIVE EXAM: HCPCS | Performed by: FAMILY MEDICINE

## 2025-08-13 PROCEDURE — 3017F COLORECTAL CA SCREEN DOC REV: CPT | Performed by: FAMILY MEDICINE

## 2025-08-13 PROCEDURE — 3044F HG A1C LEVEL LT 7.0%: CPT | Performed by: FAMILY MEDICINE

## 2025-08-14 LAB
ESTIMATED AVERAGE GLUCOSE: 128 MG/DL
HBA1C MFR BLD: 6.1 % (ref 4–6)

## 2025-08-15 ENCOUNTER — APPOINTMENT (OUTPATIENT)
Dept: URBAN - METROPOLITAN AREA CLINIC 204 | Age: 65
Setting detail: DERMATOLOGY
End: 2025-08-15

## 2025-08-15 DIAGNOSIS — L82.1 OTHER SEBORRHEIC KERATOSIS: ICD-10-CM

## 2025-08-15 DIAGNOSIS — D18.0 HEMANGIOMA: ICD-10-CM

## 2025-08-15 DIAGNOSIS — D22 MELANOCYTIC NEVI: ICD-10-CM

## 2025-08-15 DIAGNOSIS — L57.8 OTHER SKIN CHANGES DUE TO CHRONIC EXPOSURE TO NONIONIZING RADIATION: ICD-10-CM

## 2025-08-15 PROBLEM — D22.5 MELANOCYTIC NEVI OF TRUNK: Status: ACTIVE | Noted: 2025-08-15

## 2025-08-15 PROBLEM — D18.01 HEMANGIOMA OF SKIN AND SUBCUTANEOUS TISSUE: Status: ACTIVE | Noted: 2025-08-15

## 2025-08-15 PROCEDURE — 99213 OFFICE O/P EST LOW 20 MIN: CPT

## 2025-08-15 PROCEDURE — OTHER COUNSELING: OTHER

## 2025-08-15 PROCEDURE — OTHER MIPS QUALITY: OTHER

## 2025-08-15 ASSESSMENT — LOCATION ZONE DERM: LOCATION ZONE: TRUNK

## 2025-08-15 ASSESSMENT — LOCATION SIMPLE DESCRIPTION DERM
LOCATION SIMPLE: LEFT UPPER BACK
LOCATION SIMPLE: RIGHT UPPER BACK
LOCATION SIMPLE: ABDOMEN

## 2025-08-15 ASSESSMENT — LOCATION DETAILED DESCRIPTION DERM
LOCATION DETAILED: LEFT INFERIOR MEDIAL UPPER BACK
LOCATION DETAILED: LEFT SUPERIOR LATERAL UPPER BACK
LOCATION DETAILED: EPIGASTRIC SKIN
LOCATION DETAILED: RIGHT SUPERIOR UPPER BACK